# Patient Record
Sex: FEMALE | Race: WHITE | NOT HISPANIC OR LATINO | Employment: FULL TIME | URBAN - METROPOLITAN AREA
[De-identification: names, ages, dates, MRNs, and addresses within clinical notes are randomized per-mention and may not be internally consistent; named-entity substitution may affect disease eponyms.]

---

## 2022-01-17 ENCOUNTER — OFFICE VISIT (OUTPATIENT)
Dept: NEUROLOGY | Facility: CLINIC | Age: 42
End: 2022-01-17

## 2022-01-17 VITALS
HEART RATE: 87 BPM | BODY MASS INDEX: 23.92 KG/M2 | SYSTOLIC BLOOD PRESSURE: 116 MMHG | OXYGEN SATURATION: 98 % | WEIGHT: 130 LBS | TEMPERATURE: 97.8 F | HEIGHT: 62 IN | DIASTOLIC BLOOD PRESSURE: 68 MMHG

## 2022-01-17 DIAGNOSIS — G43.009 MIGRAINE WITHOUT AURA AND WITHOUT STATUS MIGRAINOSUS, NOT INTRACTABLE: Primary | ICD-10-CM

## 2022-01-17 PROBLEM — G47.63 SLEEP-RELATED BRUXISM: Status: ACTIVE | Noted: 2022-01-17

## 2022-01-17 PROCEDURE — 99204 OFFICE O/P NEW MOD 45 MIN: CPT | Performed by: NURSE PRACTITIONER

## 2022-01-17 RX ORDER — SERTRALINE HYDROCHLORIDE 100 MG/1
TABLET, FILM COATED ORAL
COMMUNITY
Start: 2022-01-05 | End: 2022-09-28 | Stop reason: SDUPTHER

## 2022-01-17 RX ORDER — IBUPROFEN 800 MG/1
1 TABLET ORAL EVERY 8 HOURS SCHEDULED
COMMUNITY
Start: 2021-11-09 | End: 2023-03-02 | Stop reason: SDUPTHER

## 2022-01-17 RX ORDER — DICYCLOMINE HCL 20 MG
1 TABLET ORAL 4 TIMES DAILY PRN
COMMUNITY
Start: 2021-09-02 | End: 2022-06-28

## 2022-01-17 RX ORDER — TRAZODONE HYDROCHLORIDE 100 MG/1
TABLET ORAL
COMMUNITY
Start: 2022-01-05 | End: 2022-07-06

## 2022-01-17 RX ORDER — SUMATRIPTAN 50 MG/1
50 TABLET, FILM COATED ORAL
COMMUNITY
End: 2022-06-28

## 2022-01-17 RX ORDER — FLUTICASONE PROPIONATE 50 MCG
1-2 SPRAY, SUSPENSION (ML) NASAL
COMMUNITY
Start: 2021-09-20 | End: 2022-09-28 | Stop reason: SDUPTHER

## 2022-01-17 RX ORDER — PROPRANOLOL HYDROCHLORIDE 20 MG/1
20 TABLET ORAL 2 TIMES DAILY
Qty: 60 TABLET | Refills: 3 | Status: SHIPPED | OUTPATIENT
Start: 2022-01-17 | End: 2022-05-26

## 2022-01-17 NOTE — PROGRESS NOTES
Headache New Office Visit      Encounter Date: 2022   Patient Name: Ivette Hughes  : 1980   MRN: [id number]   PCP: Dr Liu  Chief Complaint:    Chief Complaint   Patient presents with   • Migraine       History of Present Illness: Ivette Hughes is a 41 y.o. female who is here today with her mother for evaluation of headaches.     Headache Symptoms:   Onset . Worse since covid in Aug 2021  Prior to covid 8-12 migraines/ month. Since covid it has been daily. Has only had 5 headache free days since covid. Severity and frequency and sx of nausea increased since covid.    Days per month: daily  Location: starts behind left eye and radiates to left temple and forehead, then band distribution      Quality: Throbbing and sqeezing  Duration: 24 hours. Goes to sleep and wakes up with a headache.   Severity: 7-8/10  Triggers: hormones, odor, stress  Associated Symptoms: Nausea, Vomiting, Photophobia, Phonophobia, Scent sensitivity , Dizziness and  Vision changes black spots that move  Aura: scotoma  Visual Changes: black spots    Abortives: Imitrex, Maxalt, Nurtec-ineffective, fioricet,  lortabs, toradol, Aleve, IBU, excedrin, Tylenol  Preventives:Emgality x2 years, stopped due ineffective Elavil-ineffective, Topamax-brain fog                                                                           Currently taking  TID since November.   Does not drink enough water during the day.  Sleeping 6-8 hours a night  Quit seeing a counselor. Stress can be extreme.   Has good support from Mom.    Has not had covid vaccine and does not plan on being vaccinated.    Has used Botox for TMJ with success    Greater than 15 HA a month, lasting over 4 - 6 hours, moderate to severe intensity. Lasting over 6 months.    Subjective      Past Medical History:   Past Medical History:   Diagnosis Date   • Anxiety    • Cluster headache    • Gall stones    • Kidney stones    • Migraine headache    • Panic attack     • Tension headache        Past Surgical History:   Past Surgical History:   Procedure Laterality Date   • APPENDECTOMY     •  SECTION     • CHOLECYSTECTOMY         Family History:   Family History   Problem Relation Age of Onset   • Hypertension Mother    • Migraines Mother    • Leukemia Father    • Hypertension Father    • Migraine headaches Son        Social History:   Social History     Socioeconomic History   • Marital status:    Tobacco Use   • Smoking status: Never Smoker   • Smokeless tobacco: Never Used   Vaping Use   • Vaping Use: Never used   Substance and Sexual Activity   • Alcohol use: Never   • Drug use: Never   • Sexual activity: Defer       Medications:     Current Outpatient Medications:   •  dicyclomine (BENTYL) 20 MG tablet, Take 1 tablet by mouth 4 (Four) Times a Day As Needed., Disp: , Rfl:   •  fluticasone (FLONASE) 50 MCG/ACT nasal spray, 1-2 sprays into the nostril(s) as directed by provider., Disp: , Rfl:   •  Galcanezumab-gnlm (EMGALITY SC), Inject  under the skin into the appropriate area as directed., Disp: , Rfl:   •  ibuprofen (ADVIL,MOTRIN) 800 MG tablet, Take 1 tablet by mouth Every 8 (Eight) Hours., Disp: , Rfl:   •  sertraline (Zoloft) 100 MG tablet, TAKE 1.5 TABLET BY MOUTH DAILY, Disp: , Rfl:   •  SUMAtriptan (IMITREX) 50 MG tablet, Take 50 mg by mouth Every 2 (Two) Hours As Needed for Migraine. Take one tablet at onset of headache. May repeat dose one time in 2 hours if headache not relieved., Disp: , Rfl:   •  traZODone (DESYREL) 100 MG tablet, , Disp: , Rfl:   •  propranolol (INDERAL) 20 MG tablet, Take 1 tablet by mouth 2 (Two) Times a Day., Disp: 60 tablet, Rfl: 3    Allergies:   No Known Allergies    PHQ-9 Total Score:     STEADI Fall Risk Assessment has not been completed.    Objective     Physical Exam:   Physical Exam  Eyes:      Pupils: Pupils are equal, round, and reactive to light.   Neurological:      Mental Status: She is oriented to person,  place, and time.      Coordination: Finger-Nose-Finger Test, Heel to Shin Test and Romberg Test normal.      Gait: Gait is intact.      Deep Tendon Reflexes:      Reflex Scores:       Tricep reflexes are 2+ on the right side and 2+ on the left side.       Bicep reflexes are 2+ on the right side and 2+ on the left side.       Brachioradialis reflexes are 2+ on the right side and 2+ on the left side.       Patellar reflexes are 2+ on the right side and 2+ on the left side.       Achilles reflexes are 2+ on the right side and 2+ on the left side.  Psychiatric:         Speech: Speech normal.         Neurologic Exam     Mental Status   Oriented to person, place, and time.   Follows 3 step commands.   Attention: normal. Concentration: normal.   Speech: speech is normal   Level of consciousness: alert  Knowledge: consistent with education.   Normal comprehension.     Cranial Nerves     CN III, IV, VI   Pupils are equal, round, and reactive to light.  Right pupil: Accommodation: intact.   Left pupil: Accommodation: intact.   CN III: no CN III palsy  CN VI: no CN VI palsy  Nystagmus: none   Diplopia: none  Upgaze: normal  Downgaze: normal  Conjugate gaze: present    CN VII   Facial expression full, symmetric.     CN VIII   Hearing: intact    CN XII   CN XII normal.     Motor Exam   Muscle bulk: normal  Overall muscle tone: normal    Strength   Right biceps: 5/5  Left biceps: 5/5  Right triceps: 5/5  Left triceps: 5/5  Right interossei: 5/5  Left interossei: 5/5  Right quadriceps: 5/5  Left quadriceps: 5/5  Right anterior tibial: 5/5  Left anterior tibial: 5/5  Right posterior tibial: 5/5  Left posterior tibial: 5/5    Sensory Exam   Light touch normal.     Gait, Coordination, and Reflexes     Gait  Gait: normal    Coordination   Romberg: negative  Finger to nose coordination: normal  Heel to shin coordination: normal    Tremor   Resting tremor: absent  Action tremor: absent    Reflexes   Right brachioradialis: 2+  Left  "brachioradialis: 2+  Right biceps: 2+  Left biceps: 2+  Right triceps: 2+  Left triceps: 2+  Right patellar: 2+  Left patellar: 2+  Right achilles: 2+  Left achilles: 2+  Right : 2+  Left : 2+       Vital Signs:   Vitals:    01/17/22 0903   BP: 116/68   Pulse: 87   Temp: 97.8 °F (36.6 °C)   SpO2: 98%   Weight: 59 kg (130 lb)   Height: 157.5 cm (62\")     Body mass index is 23.78 kg/m².     Results:   Imaging:   No Images in the past 120 days found..       Assessment / Plan      Assessment/Plan:   Diagnoses and all orders for this visit:    1. Migraine without aura and without status migrainosus, not intractable (Primary)  Comments:  Add propranolol and Botox  Orders:  -     MRI Brain With & Without Contrast; Future  -     propranolol (INDERAL) 20 MG tablet; Take 1 tablet by mouth 2 (Two) Times a Day.  Dispense: 60 tablet; Refill: 3           Patient Education:   I have discussed with the patient today the causes and overview of headaches. We discussed the different types of headaches to include tension-type headaches, Migraine headaches and Cluster headaches. We also discussed when headaches could or would be of a more serious condition such as brain infection, inflammation or bleeding within or around the brain. When to seek immediate medical attention or call 911.     Reviewed medications, potential side effects and signs and symptoms to report. Discussed risk versus benefits of treatment plan with patient and/or family-including medications, labs and radiology that may be ordered. Addressed questions and concerns during visit. Patient and/or family verbalized understanding and agree with plan. Instructed to call the office with any questions and report to ER with any life-threatening symptoms.     Follow Up:   Return if symptoms worsen or fail to improve.    During this visit the following were done:  Labs Reviewed []    Labs Ordered []    Radiology Reports Reviewed []    Radiology Ordered [x]    PCP " Records Reviewed [x]    Referring Provider Records Reviewed []    ER Records Reviewed []    Hospital Records Reviewed []    History Obtained From Family [x]    Radiology Images Reviewed []    Other Reviewed [x]    Records Requested []      Kyra Vázquez, DNP, APRN

## 2022-01-19 ENCOUNTER — TELEPHONE (OUTPATIENT)
Dept: NEUROLOGY | Facility: CLINIC | Age: 42
End: 2022-01-19

## 2022-01-19 ENCOUNTER — SPECIALTY PHARMACY (OUTPATIENT)
Dept: ONCOLOGY | Facility: HOSPITAL | Age: 42
End: 2022-01-19

## 2022-01-19 NOTE — TELEPHONE ENCOUNTER
DILLON from [facility]----- Message from Chante Shukla, Pharmacy Technician sent at 1/19/2022  7:51 AM EST -----  Regarding: Botox  Botox has been approved through the pharmacy side. [facility] will fill the Botox and ship to [facility]. Once I have set up shipping and discussed copay program/cost with patient, we will schedule delivery. Patient can then be scheduled for Botox. Thanks.

## 2022-01-20 ENCOUNTER — TELEPHONE (OUTPATIENT)
Dept: NEUROLOGY | Facility: CLINIC | Age: 42
End: 2022-01-20

## 2022-01-20 NOTE — PROGRESS NOTES
Specialty Pharmacy Refill Coordination Note     Ivette is a 41 y.o. female contacted today regarding her Botox 200 units specialty medication(s). Patient will receive treatment at the provider's office.    Reviewed and verified with patient: Yes  Specialty medication(s) and dose(s) confirmed: yes        Delivery Questions      Most Recent Value   Delivery method FedEx  [Ship Botox to Dr. Nuñez's Office: [address]   Delivery address correct? Yes   Preferred delivery time? Anytime   Number of medications in delivery 1   Medication being filled and delivered Botox 200 units   Doses left of specialty medications 0   Is there any medication that is due not being filled? No   Supplies needed? No supplies needed   Cooler needed? Yes   Do any medications need mixed or dated? No   Copay form of payment Credit card on file   Questions or concerns for the pharmacist? No   Are any medications first time fills? No                 Follow-up: 12 weeks     Catalina Shukla, Pharmacy Technician  Specialty Pharmacy Technician

## 2022-01-20 NOTE — TELEPHONE ENCOUNTER
----- Message from Catalina Shukla, Pharmacy Technician sent at 1/20/2022  9:35 AM EST -----  Regarding: Botox  Just spoke with the patient and she is good to go. [facility] will ship the script to the office on Monday 1/24/2022, and it should arrive Tuesday 1/25/2022. Please schedule the patient for anytime after 1/25. Nova will help educate. Thanks.

## 2022-01-26 ENCOUNTER — PROCEDURE VISIT (OUTPATIENT)
Dept: NEUROLOGY | Facility: CLINIC | Age: 42
End: 2022-01-26

## 2022-01-26 DIAGNOSIS — G43.009 MIGRAINE WITHOUT AURA AND WITHOUT STATUS MIGRAINOSUS, NOT INTRACTABLE: Primary | ICD-10-CM

## 2022-01-26 PROCEDURE — 64615 CHEMODENERV MUSC MIGRAINE: CPT | Performed by: NURSE PRACTITIONER

## 2022-01-26 NOTE — PROGRESS NOTES
Botox Procedure Note       Patient Name: Ivette Hughes  : 1980   MRN: [id number]     Chief Complaint:    Chief Complaint   Patient presents with   • Botulinum Toxin Injection     Patient supplied, 200 units       History of Present Illness: Ivette Hughes is a 41 y.o. female who is here today for Botox injections for migraines.    This is her first Botox injection for migraines.    Abortives: Imitrex, Maxalt, Nurtec-ineffective, fioricet,  lortabs, toradol, Aleve, IBU, excedrin, Tylenol  Preventives:Emgality x2 years, stopped due ineffective Elavil-ineffective, Topamax-brain fog, propranolol                                                                               We have discussed risk and benefits of this Botox procedure and common side effects including headache, bleeding, infection, worsening of pain, neck pain, neck stiffness or weakness, damage to the areas being injected, weakness of muscles, loss of muscle control, ptosis, flu-like symptoms as well as more serious possible adverse effects including possible dysphagia, facial droop if injecting the facial area, painful injection, respiratory distress or even death (death has only been reported once with adults for Botox for migraines in another state when mixed with lidocaine solution which we do not use lidocaine solution in our practice for mixing Botox). The patient verbally understands and accepts risks and agrees with moving forward with Botox injections for chronic migraine prevention.    Verbal and written consent has been obtained from the patient prior to beginning treatment injections.          Subjective     The following portions of the patient's history were reviewed an updated as appropriate: past family history, past medical history, past social history, past surgical history.     Medications:     Current Outpatient Medications:   •  dicyclomine (BENTYL) 20 MG tablet, Take 1 tablet by mouth 4 (Four) Times a Day As Needed., Disp: ,  Rfl:   •  fluticasone (FLONASE) 50 MCG/ACT nasal spray, 1-2 sprays into the nostril(s) as directed by provider., Disp: , Rfl:   •  Galcanezumab-gnlm (EMGALITY SC), Inject  under the skin into the appropriate area as directed., Disp: , Rfl:   •  ibuprofen (ADVIL,MOTRIN) 800 MG tablet, Take 1 tablet by mouth Every 8 (Eight) Hours., Disp: , Rfl:   •  OnabotulinumtoxinA 200 units reconstituted solution, FOR . PHYSICIAN TO INJECT 155 UNITS INTRAMUSCULARLY INTO HEAD, NECK AND SHOULDERS EVERY 12 WEEKS Per FDA PROTOCOL, Disp: 1 each, Rfl: 3  •  propranolol (INDERAL) 20 MG tablet, Take 1 tablet by mouth 2 (Two) Times a Day., Disp: 60 tablet, Rfl: 3  •  sertraline (Zoloft) 100 MG tablet, TAKE 1.5 TABLET BY MOUTH DAILY, Disp: , Rfl:   •  SUMAtriptan (IMITREX) 50 MG tablet, Take 50 mg by mouth Every 2 (Two) Hours As Needed for Migraine. Take one tablet at onset of headache. May repeat dose one time in 2 hours if headache not relieved., Disp: , Rfl:   •  traZODone (DESYREL) 100 MG tablet, , Disp: , Rfl:     Allergies:   No Known Allergies    Objective     Physical Exam:  Vital Signs: There were no vitals filed for this visit.  There is no height or weight on file to calculate BMI.     Physical Exam  Constitutional:       General: She is not in acute distress.     Appearance: She is well-developed.   HENT:      Head: Normocephalic and atraumatic.      Right Ear: External ear normal.      Left Ear: External ear normal.      Nose: Nose normal.   Eyes:      General: No scleral icterus.        Right eye: No discharge.         Left eye: No discharge.      Conjunctiva/sclera: Conjunctivae normal.   Pulmonary:      Effort: Pulmonary effort is normal.   Musculoskeletal:         General: No deformity.      Cervical back: Neck supple.   Skin:     General: Skin is warm and dry.      Findings: No rash.   Neurological:      General: No focal deficit present.      Mental Status: She is alert and oriented to person, place,  and time. Mental status is at baseline.      Motor: No abnormal muscle tone.   Psychiatric:         Mood and Affect: Mood normal.         Behavior: Behavior normal.         Thought Content: Thought content normal.         Judgment: Judgment normal.         Neurologic Exam     Mental Status   Oriented to person, place, and time.       BOTOX PROCEDURE:     The patient was placed in a comfortable area and the sites to be treated were identified. A time out was called and performed. The area to be treated was prepped three times with alcohol and the alcohol allowed to dry. Next, a 30 gauge needle was used to inject the medication in the area to be treated.     Date of Last Injection: first  Response: NA  Onset of response: NA   Wearing off: NA  Side Effects: NA  : Allergan  Lot #: [id number]  Expiration Date: 09/2024  NDC: [id number]     Botox was injected using FDA approved protocol for chronic migraine prevention.   200 unit vial Botox was re-constituted with 4 mL 0.9 NS to 5 unit/0.1 mL using standard techniques.  10 units were given at the  muscle in 2 divided sites  5 units were given at the Procerus muscle  20 units were given at the Frontalis muscle in 4 divided sites  40 units were given at the Temporalis muscle in 8 divided sites  30 units were given at the Occipitalis muscle in 6 divided sites  20 units were given at the Cervical paraspinal muscle in 4 divided sites  30 units were given at the Trapezius muscle in 6 divided sites    The total amount injected in units is 155  The total amount wasted in units is 45  The total amount submitted in units is 200.   Botox was administered by Nurse practitioner.     Patient tolerated procedure well with no immediate complications.     Assessment / Plan      Assessment/Plan:   Diagnoses and all orders for this visit:    1. Migraine without aura and without status migrainosus, not intractable (Primary)         Patient Education:     Reviewed  medications, potential side effects and signs and symptoms to report. Discussed risk versus benefits of treatment plan with patient and/or family-including medications, labs and radiology that may be ordered. Addressed questions and concerns during visit. Patient and/or family verbalized understanding and agree with plan. Instructed to call the office with any questions and report to ER with any life-threatening symptoms.     Follow Up:   It has been determined that [unfilled] has chronic migraine headaches. We will proceed with a 12 week regimen of 200 units of Botox injections, to treat the patient's chronic migraines.  Return in about 3 months (around 4/26/2022).    During this visit the following were done:  Labs Reviewed []    Labs Ordered []    Radiology Reports Reviewed []    Radiology Ordered []    PCP Records Reviewed []    Referring Provider Records Reviewed []    ER Records Reviewed []    Hospital Records Reviewed []    History Obtained From Family []    Radiology Images Reviewed []    Other Reviewed [x]    Records Requested []        Kyra Vázquez, DNP, APRN

## 2022-01-27 ENCOUNTER — PATIENT ROUNDING (BHMG ONLY) (OUTPATIENT)
Dept: NEUROLOGY | Facility: CLINIC | Age: 42
End: 2022-01-27

## 2022-01-27 NOTE — PROGRESS NOTES
January 27, 2022    Hello, may I speak with Ivette Hughes?    My name is TERRANCE     I am  with [facility]  [address]  [place] [facility] [address] [phone].    Before we get started may I verify your date of birth? 1980    I am calling to officially welcome you to our practice and ask about your recent visit. Is this a good time to talk?   YES    Tell me about your visit with us. What things went well?    THE STAFF WAS FRIENDLY. SHE CAME UP WITH A TREATMENT PLAN FOR ME. IT WAS A GREAT FIRST VISIT.       We're always looking for ways to make our patients' experiences even better. Do you have recommendations on ways we may improve?    NO    Overall were you satisfied with your first visit to our practice?   YES     I appreciate you taking the time to speak with me today. Is there anything else I can do for you?  NO      Thank you, and have a great day.

## 2022-02-21 ENCOUNTER — APPOINTMENT (OUTPATIENT)
Dept: MRI IMAGING | Facility: HOSPITAL | Age: 42
End: 2022-02-21

## 2022-03-15 ENCOUNTER — HOSPITAL ENCOUNTER (OUTPATIENT)
Dept: MRI IMAGING | Facility: HOSPITAL | Age: 42
Discharge: HOME | End: 2022-03-15
Admitting: NURSE PRACTITIONER

## 2022-03-15 DIAGNOSIS — G43.009 MIGRAINE WITHOUT AURA AND WITHOUT STATUS MIGRAINOSUS, NOT INTRACTABLE: ICD-10-CM

## 2022-03-15 PROCEDURE — 70553 MRI BRAIN STEM W/O & W/DYE: CPT

## 2022-03-15 PROCEDURE — A9577 INJ MULTIHANCE: HCPCS | Performed by: NURSE PRACTITIONER

## 2022-03-15 PROCEDURE — 0 GADOBENATE DIMEGLUMINE 529 MG/ML SOLUTION: Performed by: NURSE PRACTITIONER

## 2022-03-15 RX ADMIN — GADOBENATE DIMEGLUMINE 11 ML: 529 INJECTION, SOLUTION INTRAVENOUS at 13:26

## 2022-03-17 ENCOUNTER — TELEPHONE (OUTPATIENT)
Dept: NEUROLOGY | Facility: CLINIC | Age: 42
End: 2022-03-17

## 2022-03-17 NOTE — TELEPHONE ENCOUNTER
----- Message from Kyra Vázquez, JOHANNA, APRN sent at 3/16/2022 10:10 AM EDT -----  Please let her know MRI of brain is normal.

## 2022-03-24 RX ORDER — AZITHROMYCIN 250 MG/1
TABLET, FILM COATED ORAL
Qty: 6 TABLET | Refills: 0 | Status: SHIPPED | OUTPATIENT
Start: 2022-03-24 | End: 2022-06-28

## 2022-03-24 RX ORDER — METHYLPREDNISOLONE 4 MG/1
TABLET ORAL
Qty: 21 TABLET | Refills: 0 | Status: SHIPPED | OUTPATIENT
Start: 2022-03-24 | End: 2022-06-28

## 2022-04-14 ENCOUNTER — SPECIALTY PHARMACY (OUTPATIENT)
Dept: ONCOLOGY | Facility: HOSPITAL | Age: 42
End: 2022-04-14

## 2022-04-15 ENCOUNTER — SPECIALTY PHARMACY (OUTPATIENT)
Dept: ONCOLOGY | Facility: HOSPITAL | Age: 42
End: 2022-04-15

## 2022-04-15 NOTE — PROGRESS NOTES
Specialty Pharmacy Refill Coordination Note     Ivette is a 41 y.o. female contacted today regarding refills of  Botox specialty medication(s). Patient appointment is scheduled for 4/21.    Reviewed and verified with patient:         Specialty medication(s) and dose(s) confirmed: yes    Refill Questions    Flowsheet Row Most Recent Value   Changes to allergies? No   Changes to medications? No   New conditions since last clinic visit No   Unplanned office visit, urgent care, ED, or hospital admission in the last 4 weeks  No   How does patient/caregiver feel medication is working? Excellent   Financial problems or insurance changes  No   If yes, describe changes in insurance or financial issues. N/A   How many doses of your specialty medications were missed in the last 4 weeks? 0   Why were doses missed? N/A   Does this patient require a clinical escalation to a pharmacist? No          Delivery Questions    Flowsheet Row Most Recent Value   Delivery method FedEx   Delivery address correct? Yes   Preferred delivery time? Anytime   Number of medications in delivery 1   Medication being filled and delivered Botox-shipped to [facility] left of specialty medications 0   Is there any medication that is due not being filled? No   Supplies needed? No supplies needed   Cooler needed? Yes   Do any medications need mixed or dated? No   Copay form of payment Payment plan already set up   Questions or concerns for the pharmacist? No   Are any medications first time fills? No                 Follow-up: 84 day(s)     Ne Caraballo, Pharmacy Technician  Specialty Pharmacy Technician

## 2022-04-19 DIAGNOSIS — Z87.442 HISTORY OF NEPHROLITHIASIS: ICD-10-CM

## 2022-04-19 DIAGNOSIS — R10.9 FLANK PAIN: Primary | ICD-10-CM

## 2022-04-21 ENCOUNTER — PROCEDURE VISIT (OUTPATIENT)
Dept: NEUROLOGY | Facility: CLINIC | Age: 42
End: 2022-04-21

## 2022-04-21 DIAGNOSIS — G43.009 MIGRAINE WITHOUT AURA AND WITHOUT STATUS MIGRAINOSUS, NOT INTRACTABLE: Primary | ICD-10-CM

## 2022-04-21 PROCEDURE — 64615 CHEMODENERV MUSC MIGRAINE: CPT | Performed by: NURSE PRACTITIONER

## 2022-04-21 NOTE — PROGRESS NOTES
Botox Procedure Note       Patient Name: Ivette Hughes  : 1980   MRN: [id number]   PCP: Dr Macias  Chief Complaint:    Chief Complaint   Patient presents with   • Botulinum Toxin Injection       History of Present Illness: Ivette Hughes is a 41 y.o. female who is here today for Botox injections for migraines.     Had a breakthrough HA requiring Toradol.    Abortives: Imitrex, Maxalt, Nurtec-ineffective, fioricet,  lortabs, toradol, Aleve, IBU, excedrin, Tylenol  Preventives:Emgality x2 years, stopped due ineffective Elavil-ineffective, Topamax-brain fog, propranolol           We have discussed risk and benefits of this Botox procedure and common side effects including headache, bleeding, infection, worsening of pain, neck pain, neck stiffness or weakness, damage to the areas being injected, weakness of muscles, loss of muscle control, ptosis, flu-like symptoms as well as more serious possible adverse effects including possible dysphagia, facial droop if injecting the facial area, painful injection, respiratory distress or even death (death has only been reported once with adults for Botox for migraines in another state when mixed with lidocaine solution which we do not use lidocaine solution in our practice for mixing Botox). The patient verbally understands and accepts risks and agrees with moving forward with Botox injections for chronic migraine prevention.    Verbal and written consent has been obtained from the patient prior to beginning treatment injections.     Pertinent Medical History:  Response to treatment has reduced headaches at least 7 fewer days a month and / or 100 hours fewer each month.       Subjective        The following portions of the patient's history were reviewed an updated as appropriate: past family history, past medical history, past social history, past surgical history.     Medications:     Current Outpatient Medications:   •  azithromycin (Zithromax Z-Eber) 250 MG  tablet, 500mg PO x 1 on day 1, then 250mg PO q24h x 4 days, Disp: 6 tablet, Rfl: 0  •  dicyclomine (BENTYL) 20 MG tablet, Take 1 tablet by mouth 4 (Four) Times a Day As Needed., Disp: , Rfl:   •  fluticasone (FLONASE) 50 MCG/ACT nasal spray, 1-2 sprays into the nostril(s) as directed by provider., Disp: , Rfl:   •  Galcanezumab-gnlm (EMGALITY SC), Inject  under the skin into the appropriate area as directed., Disp: , Rfl:   •  ibuprofen (ADVIL,MOTRIN) 800 MG tablet, Take 1 tablet by mouth Every 8 (Eight) Hours., Disp: , Rfl:   •  methylPREDNISolone (MEDROL) 4 MG dose pack, Take as directed on package instructions., Disp: 21 tablet, Rfl: 0  •  OnabotulinumtoxinA 200 units reconstituted solution, FOR . PHYSICIAN TO INJECT 155 UNITS INTRAMUSCULARLY INTO HEAD, NECK AND SHOULDERS EVERY 12 WEEKS Per FDA PROTOCOL, Disp: 1 each, Rfl: 3  •  propranolol (INDERAL) 20 MG tablet, Take 1 tablet by mouth 2 (Two) Times a Day., Disp: 60 tablet, Rfl: 3  •  sertraline (Zoloft) 100 MG tablet, TAKE 1.5 TABLET BY MOUTH DAILY, Disp: , Rfl:   •  SUMAtriptan (IMITREX) 50 MG tablet, Take 50 mg by mouth Every 2 (Two) Hours As Needed for Migraine. Take one tablet at onset of headache. May repeat dose one time in 2 hours if headache not relieved., Disp: , Rfl:   •  traZODone (DESYREL) 100 MG tablet, , Disp: , Rfl:     Current Facility-Administered Medications:   •  OnabotulinumtoxinA 200 Units, 200 Units, Intramuscular, Q3 Months, Kyra Vázquez, DNP, APRN, 200 Units at 01/26/22 1401    Allergies:   No Known Allergies    Objective     Physical Exam:  Vital Signs: There were no vitals filed for this visit.  There is no height or weight on file to calculate BMI.     Physical Exam    Neurologic Exam    BOTOX PROCEDURE:     The patient was placed in a comfortable area and the sites to be treated were identified. A time out was called and performed. The area to be treated was prepped three times with alcohol and the  alcohol allowed to dry. Next, a 30 gauge needle was used to inject the medication in the area to be treated.     Date of Last Injection: 1/26/2022  Response: good  Onset of response: 1 week   Wearing off: no  Side Effects: no  :  [employer]  Lot #: [id number]  Expiration Date: 12/2024  NDC: 42807dg27    Botox is patient supplied.     Botox was injected using FDA approved protocol for chronic migraine prevention.   200 unit vial Botox was re-constituted with 4 mL 0.9 NS to 5 unit/0.1 mL using standard techniques.  10 units were given at the  muscle in 2 divided sites  5 units were given at the Procerus muscle  20 units were given at the Frontalis muscle in 4 divided sites  40 units were given at the Temporalis muscle in 8 divided sites  30 units were given at the Occipitalis muscle in 6 divided sites  20 units were given at the Cervical paraspinal muscle in 4 divided sites  30 units were given at the Trapezius muscle in 6 divided sites    The total amount injected in units is 155  The total amount wasted in units is 45  The total amount submitted in units is 200.   Botox was administered by Nurse practitioner.     Patient tolerated procedure well with no immediate complications.     Assessment / Plan      Assessment/Plan:   Diagnoses and all orders for this visit:    1. Migraine without aura and without status migrainosus, not intractable (Primary)  Comments:  Cont Botox.     Pt supplied    Patient Education:       Reviewed medications, potential side effects and signs and symptoms to report. Discussed risk versus benefits of treatment plan with patient and/or family-including medications, labs and radiology that may be ordered. Addressed questions and concerns during visit. Patient and/or family verbalized understanding and agree with plan. Instructed to call the office with any questions and report to ER with any life-threatening symptoms.     Follow Up:   It has been determined that  [unfilled] has chronic migraine headaches. We will proceed with a 12 week regimen of 200 units of Botox injections, to treat the patient's chronic migraines.  No follow-ups on file.    During this visit the following were done:  Labs Reviewed []    Labs Ordered []    Radiology Reports Reviewed []    Radiology Ordered []    PCP Records Reviewed []    Referring Provider Records Reviewed []    ER Records Reviewed []    Hospital Records Reviewed []    History Obtained From Family []    Radiology Images Reviewed []    Other Reviewed [x]    Records Requested []        Kyra Vázquez, DNP, APRN

## 2022-05-26 DIAGNOSIS — G43.009 MIGRAINE WITHOUT AURA AND WITHOUT STATUS MIGRAINOSUS, NOT INTRACTABLE: ICD-10-CM

## 2022-05-26 RX ORDER — PROPRANOLOL HYDROCHLORIDE 20 MG/1
TABLET ORAL
Qty: 60 TABLET | Refills: 3 | Status: SHIPPED | OUTPATIENT
Start: 2022-05-26 | End: 2022-06-28

## 2022-05-26 NOTE — TELEPHONE ENCOUNTER
Rx Refill Note  Requested Prescriptions     Pending Prescriptions Disp Refills   • propranolol (INDERAL) 20 MG tablet [Pharmacy Med Name: PROPRANOLOL 20 MG TABLET] 60 tablet 3     Sig: TAKE ONE TABLET BY MOUTH TWICE A DAY      Last filled: 1/17/22 with 3 refills  Sent this in.   Last office visit with prescribing clinician: 1/17/2022      Next office visit with prescribing clinician: 7/15/2022     Falguni Mckinney MA  05/26/22, 10:04 EDT

## 2022-06-14 ENCOUNTER — DOCUMENTATION (OUTPATIENT)
Dept: FAMILY MEDICINE CLINIC | Facility: CLINIC | Age: 42
End: 2022-06-14

## 2022-06-14 RX ORDER — FLUCONAZOLE 150 MG/1
TABLET ORAL
Qty: 2 TABLET | Refills: 1 | Status: SHIPPED | OUTPATIENT
Start: 2022-06-14 | End: 2022-06-28

## 2022-06-28 ENCOUNTER — OFFICE VISIT (OUTPATIENT)
Dept: FAMILY MEDICINE CLINIC | Facility: CLINIC | Age: 42
End: 2022-06-28

## 2022-06-28 VITALS
OXYGEN SATURATION: 98 % | DIASTOLIC BLOOD PRESSURE: 68 MMHG | TEMPERATURE: 97.7 F | SYSTOLIC BLOOD PRESSURE: 110 MMHG | BODY MASS INDEX: 24.73 KG/M2 | WEIGHT: 134.4 LBS | HEART RATE: 97 BPM | HEIGHT: 62 IN

## 2022-06-28 DIAGNOSIS — G43.719 INTRACTABLE CHRONIC MIGRAINE WITHOUT AURA AND WITHOUT STATUS MIGRAINOSUS: Primary | ICD-10-CM

## 2022-06-28 DIAGNOSIS — N89.8 VAGINAL ODOR: ICD-10-CM

## 2022-06-28 PROCEDURE — 96372 THER/PROPH/DIAG INJ SC/IM: CPT | Performed by: FAMILY MEDICINE

## 2022-06-28 PROCEDURE — 99214 OFFICE O/P EST MOD 30 MIN: CPT | Performed by: FAMILY MEDICINE

## 2022-06-28 RX ORDER — METHYLPREDNISOLONE 4 MG/1
TABLET ORAL
Qty: 21 TABLET | Refills: 0 | Status: SHIPPED | OUTPATIENT
Start: 2022-06-28 | End: 2022-09-28

## 2022-06-28 RX ORDER — KETOROLAC TROMETHAMINE 30 MG/ML
60 INJECTION, SOLUTION INTRAMUSCULAR; INTRAVENOUS ONCE
Status: COMPLETED | OUTPATIENT
Start: 2022-06-28 | End: 2022-06-28

## 2022-06-28 RX ORDER — HYDROCODONE BITARTRATE AND ACETAMINOPHEN 5; 325 MG/1; MG/1
1 TABLET ORAL EVERY 4 HOURS PRN
Qty: 18 TABLET | Refills: 0 | Status: SHIPPED | OUTPATIENT
Start: 2022-06-28 | End: 2022-09-28

## 2022-06-28 RX ADMIN — KETOROLAC TROMETHAMINE 60 MG: 30 INJECTION, SOLUTION INTRAMUSCULAR; INTRAVENOUS at 16:05
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
## 2022-06-28 NOTE — PROGRESS NOTES
Date: 2022   Patient Name: Ivette Hughes  : 1980   MRN: [id number]     Chief Complaint:    Chief Complaint   Patient presents with   • Migraine       History of Present Illness: Ivette Hughes is a 41 y.o. female who is here today for Persistent migraine.  She reports intractable headache that feels like a band squeezing the top of her head and sometimes moves behind her left eye.  She has tried several different medications including Topamax, Imitrex, Emgality, amitriptyline, propranolol, Ubrelvy, and Botox injections without improvement in her headaches.  She has suffered from chronic migraines for years but it has worsened since having COVID.  She has had a normal MRI and seen neurology without any significant findings.  She reports that it is significantly impacting her daily life.  Her mother had similar symptoms which resolved after a total hysterectomy and the patient thinks that hers may be hormone related as well.    Patient also complains of vaginal odor however her sense of smell has been altered since having COVID last year.  She denies discharge or vaginal itching.  She denies known history of STI or contact with anyone with a known STI.           Review of Systems:   Review of Systems   Constitutional: Negative for chills, fatigue and fever.   Respiratory: Negative for cough and shortness of breath.    Cardiovascular: Negative for chest pain and palpitations.   Gastrointestinal: Negative for abdominal pain, constipation, diarrhea, nausea and vomiting.   Genitourinary: Negative for vaginal discharge.        Vaginal odor   Musculoskeletal: Negative for back pain and myalgias.   Neurological: Positive for headache. Negative for dizziness.   Psychiatric/Behavioral: Negative for depressed mood. The patient is not nervous/anxious.        Past Medical History:   Past Medical History:   Diagnosis Date   • Anxiety    • Cluster headache    • Gall stones    • Insomnia    • Kidney stones    •  "Migraine headache    • Panic attack    • Tension headache        Past Surgical History:   Past Surgical History:   Procedure Laterality Date   • APPENDECTOMY     •  SECTION     • CHOLECYSTECTOMY         Family History:   Family History   Problem Relation Age of Onset   • Hypertension Mother    • Migraines Mother    • Leukemia Father    • Hypertension Father    • Migraine headaches Son        Social History:   Social History     Socioeconomic History   • Marital status:    Tobacco Use   • Smoking status: Never Smoker   • Smokeless tobacco: Never Used   Vaping Use   • Vaping Use: Never used   Substance and Sexual Activity   • Alcohol use: Never   • Drug use: Never   • Sexual activity: Defer       Medications:     Current Outpatient Medications:   •  fluticasone (FLONASE) 50 MCG/ACT nasal spray, 1-2 sprays into the nostril(s) as directed by provider., Disp: , Rfl:   •  ibuprofen (ADVIL,MOTRIN) 800 MG tablet, Take 1 tablet by mouth Every 8 (Eight) Hours., Disp: , Rfl:   •  methylPREDNISolone (MEDROL) 4 MG dose pack, Take as directed on package instructions., Disp: 21 tablet, Rfl: 0  •  sertraline (ZOLOFT) 100 MG tablet, TAKE 1.5 TABLET BY MOUTH DAILY, Disp: , Rfl:   •  traZODone (DESYREL) 100 MG tablet, , Disp: , Rfl:   •  HYDROcodone-acetaminophen (NORCO) 5-325 MG per tablet, Take 1 tablet by mouth Every 4 (Four) Hours As Needed for Moderate Pain ., Disp: 18 tablet, Rfl: 0    Current Facility-Administered Medications:   •  OnabotulinumtoxinA 200 Units, 200 Units, Intramuscular, Q3 Months, Kyra Vázquez, DNP, APRN, 200 Units at 22 1700    Allergies:   No Known Allergies      Physical Exam:  Vital Signs:   Vitals:    22 1519   BP: 110/68   BP Location: Left arm   Patient Position: Sitting   Cuff Size: Adult   Pulse: 97   Temp: 97.7 °F (36.5 °C)   TempSrc: Temporal   SpO2: 98%   Weight: 61 kg (134 lb 6.4 oz)   Height: 157.5 cm (62\")     Body mass index is 24.58 kg/m². "     Physical Exam  Vitals and nursing note reviewed.   Constitutional:       Appearance: Normal appearance.   HENT:      Head: Normocephalic.   Pulmonary:      Effort: Pulmonary effort is normal.   Neurological:      Mental Status: She is alert and oriented to person, place, and time.   Psychiatric:         Mood and Affect: Mood normal.         Behavior: Behavior normal.           Assessment/Plan:   Diagnoses and all orders for this visit:    1. Intractable chronic migraine without aura and without status migrainosus (Primary)  Assessment & Plan:  Headaches are worsening.  Rx Medrol pack and Toradol in office.  She was given a short-term supply of hydrocodone and that this is not a long-term treatment for her migraine.  She was also given samples of Nurtec to start as prophylactic treatment        Orders:  -     methylPREDNISolone (MEDROL) 4 MG dose pack; Take as directed on package instructions.  Dispense: 21 tablet; Refill: 0  -     ketorolac (TORADOL) injection 60 mg  -     HYDROcodone-acetaminophen (NORCO) 5-325 MG per tablet; Take 1 tablet by mouth Every 4 (Four) Hours As Needed for Moderate Pain .  Dispense: 18 tablet; Refill: 0    2. Vaginal odor  Assessment & Plan:  Aptima swab performed    Orders:  -     NuSwab VG+ - Swab, Vagina; Future  -     NuSwab VG+ - Swab, Vagina         Follow Up:   Return in about 1 month (around 7/28/2022) for Med Recheck.    Emi Macias DO  [facility]

## 2022-06-29 LAB
A VAGINAE DNA VAG QL NAA+PROBE: ABNORMAL SCORE
BVAB2 DNA VAG QL NAA+PROBE: ABNORMAL SCORE
C ALBICANS DNA VAG QL NAA+PROBE: NEGATIVE
C GLABRATA DNA VAG QL NAA+PROBE: NEGATIVE
C TRACH DNA VAG QL NAA+PROBE: NEGATIVE
MEGA1 DNA VAG QL NAA+PROBE: ABNORMAL SCORE
N GONORRHOEA DNA VAG QL NAA+PROBE: NEGATIVE
T VAGINALIS DNA VAG QL NAA+PROBE: NEGATIVE

## 2022-06-30 RX ORDER — METRONIDAZOLE 500 MG/1
500 TABLET ORAL 2 TIMES DAILY
Qty: 14 TABLET | Refills: 0 | Status: SHIPPED | OUTPATIENT
Start: 2022-06-30 | End: 2022-09-28

## 2022-07-06 RX ORDER — TRAZODONE HYDROCHLORIDE 100 MG/1
TABLET ORAL
Qty: 180 TABLET | Refills: 1 | Status: SHIPPED | OUTPATIENT
Start: 2022-07-06 | End: 2022-09-28 | Stop reason: SDUPTHER

## 2022-07-08 ENCOUNTER — SPECIALTY PHARMACY (OUTPATIENT)
Dept: ONCOLOGY | Facility: HOSPITAL | Age: 42
End: 2022-07-08

## 2022-07-08 NOTE — PROGRESS NOTES
Specialty Pharmacy Refill Coordination Note     Ivette is a 41 y.o. female contacted today regarding refills of Botox specialty medication(s). Patient due Botox in provider's office on 7/15/2022. Shipped from [facility] to provider's office.    Reviewed and verified with patient: Yes  Specialty medication(s) and dose(s) confirmed: yes    Refill Questions    Flowsheet Row Most Recent Value   Changes to allergies? No   Changes to medications? No   New conditions since last clinic visit No   Unplanned office visit, urgent care, ED, or hospital admission in the last 4 weeks  Yes  [6/28 visit to PCP. Migraines still acting up. Escalated to pharmacist. Patient coming in on 7/15 for Botox.]   How does patient/caregiver feel medication is working? Fair   Financial problems or insurance changes  No   If yes, describe changes in insurance or financial issues. N/A   Since the previous refill, were any specialty medication doses or scheduled injections missed or delayed?  No   If yes, please provide the amount N/A   Why were doses missed? N/A   Does this patient require a clinical escalation to a pharmacist? Yes  [6/28 visit to PCP. Migraines still acting up. Escalated to pharmacist. Patient coming in on 7/15 for Botox.]          Delivery Questions    Flowsheet Row Most Recent Value   Delivery method FedEx  [Ship Botox to Dr. Nuñez's office: [address].]   Delivery address correct? Yes   Preferred delivery time? Anytime   Number of medications in delivery 1   Medication being filled and delivered Botox   Doses left of specialty medications None. Administered in provider's office.   Is there any medication that is due not being filled? No   Supplies needed? No supplies needed   Cooler needed? Yes   Do any medications need mixed or dated? No   Copay form of payment Credit card on file   Questions or concerns for the pharmacist? No   Are any medications first time fills? No            Medication Adherence     Adherence tools used: calendar  Support network for adherence: healthcare provider          Follow-up:  12 weeks.     Catalina Shukla, Pharmacy Technician  Specialty Pharmacy Technician

## 2022-07-15 ENCOUNTER — PROCEDURE VISIT (OUTPATIENT)
Dept: NEUROLOGY | Facility: CLINIC | Age: 42
End: 2022-07-15

## 2022-07-15 DIAGNOSIS — G43.009 MIGRAINE WITHOUT AURA AND WITHOUT STATUS MIGRAINOSUS, NOT INTRACTABLE: Primary | ICD-10-CM

## 2022-07-15 PROCEDURE — 64615 CHEMODENERV MUSC MIGRAINE: CPT | Performed by: NURSE PRACTITIONER

## 2022-07-15 RX ORDER — OXCARBAZEPINE 300 MG/1
TABLET, FILM COATED ORAL
Qty: 60 TABLET | Refills: 2 | Status: SHIPPED | OUTPATIENT
Start: 2022-07-15 | End: 2022-09-28

## 2022-07-15 NOTE — PROGRESS NOTES
Botox Procedure Note       Patient Name: Ivette Hughes  : 1980   MRN: [id number]   PCP: Dr Macias  Chief Complaint:    Chief Complaint   Patient presents with   • Botulinum Toxin Injection       History of Present Illness: Ivette Hughes is a 41 y.o. female who is here today for Botox injections for migraines.     Headaches are not controlled. In ER and PCP office. The only med that helps is hydrocodone and migraine cocktail      Abortives: Imitrex, Maxalt, Nurtec-ineffective, fioricet,  lortabs, toradol, Aleve, IBU, excedrin, Tylenol  Preventives:Emgality x2 years, stopped due ineffective Elavil-ineffective, Topamax-brain fog,  nurtec qod            We have discussed risk and benefits of this Botox procedure and common side effects including headache, bleeding, infection, worsening of pain, neck pain, neck stiffness or weakness, damage to the areas being injected, weakness of muscles, loss of muscle control, ptosis, flu-like symptoms as well as more serious possible adverse effects including possible dysphagia, facial droop if injecting the facial area, painful injection, respiratory distress or even death (death has only been reported once with adults for Botox for migraines in another state when mixed with lidocaine solution which we do not use lidocaine solution in our practice for mixing Botox). The patient verbally understands and accepts risks and agrees with moving forward with Botox injections for chronic migraine prevention.    Verbal and written consent has been obtained from the patient prior to beginning treatment injections.     Pertinent Medical History:  Response to treatment has reduced headaches at least 7 fewer days a month and / or 100 hours fewer each month.       Subjective        The following portions of the patient's history were reviewed an updated as appropriate: past family history, past medical history, past social history, past surgical history.     Medications:      Current Outpatient Medications:   •  fluticasone (FLONASE) 50 MCG/ACT nasal spray, 1-2 sprays into the nostril(s) as directed by provider., Disp: , Rfl:   •  HYDROcodone-acetaminophen (NORCO) 5-325 MG per tablet, Take 1 tablet by mouth Every 4 (Four) Hours As Needed for Moderate Pain ., Disp: 18 tablet, Rfl: 0  •  ibuprofen (ADVIL,MOTRIN) 800 MG tablet, Take 1 tablet by mouth Every 8 (Eight) Hours., Disp: , Rfl:   •  methylPREDNISolone (MEDROL) 4 MG dose pack, Take as directed on package instructions., Disp: 21 tablet, Rfl: 0  •  metroNIDAZOLE (Flagyl) 500 MG tablet, Take 1 tablet by mouth 2 (Two) Times a Day., Disp: 14 tablet, Rfl: 0  •  OnabotulinumtoxinA 200 units reconstituted solution, FOR . PHYSICIAN TO INJECT 155 UNITS INTRAMUSCULARLY INTO HEAD, NECK AND SHOULDERS EVERY 12 WEEKS Per FDA PROTOCOL, Disp: 1 each, Rfl: 3  •  sertraline (ZOLOFT) 100 MG tablet, TAKE 1.5 TABLET BY MOUTH DAILY, Disp: , Rfl:   •  traZODone (DESYREL) 100 MG tablet, TAKE ONE TABLET BY MOUTH TWICE A DAY AFTER MEALS, Disp: 180 tablet, Rfl: 1    Current Facility-Administered Medications:   •  OnabotulinumtoxinA 200 Units, 200 Units, Intramuscular, Q3 Months, Kyra Vázquez, DNP, APRN, 200 Units at 04/21/22 1700    Allergies:   No Known Allergies    Objective     Physical Exam:  Vital Signs: There were no vitals filed for this visit.  There is no height or weight on file to calculate BMI.     Physical Exam  Alert and oriented x 3     BOTOX PROCEDURE:     The patient was placed in a comfortable area and the sites to be treated were identified. A time out was called and performed. The area to be treated was prepped three times with alcohol and the alcohol allowed to dry. Next, a 30 gauge needle was used to inject the medication in the area to be treated.     Date of Last Injection: 4/21/22  Response: good  Onset of response: immediate   Wearing off: none  Side Effects: none  : [employer]  Lot #:  [id number]  Expiration Date: 01/2025  NDC: 92103wu22     Botox is pt supplied    Botox was injected using FDA approved protocol for chronic migraine prevention.   200 unit vial Botox was re-constituted with 4 mL 0.9 NS to 5 unit/0.1 mL using standard techniques.  10 units were given at the  muscle in 2 divided sites  5 units were given at the Procerus muscle  20 units were given at the Frontalis muscle in 4 divided sites  40 units were given at the Temporalis muscle in 8 divided sites  30 units were given at the Occipitalis muscle in 6 divided sites  20 units were given at the Cervical paraspinal muscle in 4 divided sites  30 units were given at the Trapezius muscle in 6 divided sites    The total amount injected in units is 155  The total amount wasted in units is 45  The total amount submitted in units is 200.   Botox was administered by Nurse practitioner.     Patient tolerated procedure well with no immediate complications.     Assessment / Plan      Assessment/Plan:   Diagnoses and all orders for this visit:    1. Migraine without aura and without status migrainosus, not intractable (Primary)  Comments:  Cont Botox. Add OXC.         Patient Education:       Reviewed medications, potential side effects and signs and symptoms to report. Discussed risk versus benefits of treatment plan with patient and/or family-including medications, labs and radiology that may be ordered. Addressed questions and concerns during visit. Patient and/or family verbalized understanding and agree with plan. Instructed to call the office with any questions and report to ER with any life-threatening symptoms.     Follow Up:   It has been determined that [unfilled] has chronic migraine headaches. We will proceed with a 12 week regimen of 200 units of Botox injections, to treat the patient's chronic migraines.  Return in about 3 months (around 10/15/2022).    During this visit the following were done:  Labs Reviewed []     Labs Ordered []    Radiology Reports Reviewed []    Radiology Ordered []    PCP Records Reviewed []    Referring Provider Records Reviewed []    ER Records Reviewed []    Hospital Records Reviewed []    History Obtained From Family []    Radiology Images Reviewed []    Other Reviewed []    Records Requested []        Kyra Vázquez, DNP, APRN

## 2022-08-02 ENCOUNTER — CLINICAL SUPPORT (OUTPATIENT)
Dept: FAMILY MEDICINE CLINIC | Facility: CLINIC | Age: 42
End: 2022-08-02

## 2022-08-02 DIAGNOSIS — G43.711 INTRACTABLE CHRONIC MIGRAINE WITHOUT AURA AND WITH STATUS MIGRAINOSUS: ICD-10-CM

## 2022-08-02 DIAGNOSIS — G43.719 INTRACTABLE CHRONIC MIGRAINE WITHOUT AURA AND WITHOUT STATUS MIGRAINOSUS: Primary | ICD-10-CM

## 2022-08-02 PROCEDURE — 96372 THER/PROPH/DIAG INJ SC/IM: CPT | Performed by: FAMILY MEDICINE

## 2022-08-02 RX ORDER — KETOROLAC TROMETHAMINE 30 MG/ML
60 INJECTION, SOLUTION INTRAMUSCULAR; INTRAVENOUS ONCE
Status: COMPLETED | OUTPATIENT
Start: 2022-08-02 | End: 2022-08-02

## 2022-08-02 RX ORDER — TRIAMCINOLONE ACETONIDE 40 MG/ML
80 INJECTION, SUSPENSION INTRA-ARTICULAR; INTRAMUSCULAR ONCE
Status: COMPLETED | OUTPATIENT
Start: 2022-08-02 | End: 2022-08-02

## 2022-08-02 RX ADMIN — KETOROLAC TROMETHAMINE 60 MG: 30 INJECTION, SOLUTION INTRAMUSCULAR; INTRAVENOUS at 15:55

## 2022-08-02 RX ADMIN — TRIAMCINOLONE ACETONIDE 80 MG: 40 INJECTION, SUSPENSION INTRA-ARTICULAR; INTRAMUSCULAR at 15:57

## 2022-09-08 ENCOUNTER — DOCUMENTATION (OUTPATIENT)
Dept: ONCOLOGY | Facility: HOSPITAL | Age: 42
End: 2022-09-08

## 2022-09-28 ENCOUNTER — SPECIALTY PHARMACY (OUTPATIENT)
Dept: ONCOLOGY | Facility: HOSPITAL | Age: 42
End: 2022-09-28

## 2022-09-28 RX ORDER — SERTRALINE HYDROCHLORIDE 100 MG/1
150 TABLET, FILM COATED ORAL DAILY
Qty: 135 TABLET | Refills: 3 | Status: SHIPPED | OUTPATIENT
Start: 2022-09-28

## 2022-09-28 RX ORDER — TRAZODONE HYDROCHLORIDE 100 MG/1
200 TABLET ORAL NIGHTLY
Qty: 180 TABLET | Refills: 1 | Status: SHIPPED | OUTPATIENT
Start: 2022-09-28

## 2022-09-28 RX ORDER — FLUTICASONE PROPIONATE 50 MCG
1-2 SPRAY, SUSPENSION (ML) NASAL DAILY
Qty: 18.2 ML | Refills: 11 | Status: SHIPPED | OUTPATIENT
Start: 2022-09-28

## 2022-09-28 NOTE — PROGRESS NOTES
Specialty Pharmacy Refill Coordination Note     Ivette is a 42 y.o. female contacted today regarding refills of Botox specialty medication(s). Patient due Botox in provider's office on 10/10/2022. Shipped from [facility] to provider's office.    Reviewed and verified with patient: Yes  Specialty medication(s) and dose(s) confirmed: yes    Refill Questions    Flowsheet Row Most Recent Value   Changes to allergies? No   Changes to medications? No   New conditions since last clinic visit No   Unplanned office visit, urgent care, ED, or hospital admission in the last 4 weeks  No   How does patient/caregiver feel medication is working? Good   Financial problems or insurance changes  No   If yes, describe changes in insurance or financial issues. N/A   Since the previous refill, were any specialty medication doses or scheduled injections missed or delayed?  No   If yes, please provide the amount N/A   Why were doses missed? N/A   Does this patient require a clinical escalation to a pharmacist? No          Delivery Questions    Flowsheet Row Most Recent Value   Delivery method FedEx  [Ship Botox to Dr. Nuñez's office: [address] [place] [address] 201.]   Delivery address correct? Yes   Preferred delivery time? Anytime   Number of medications in delivery 1   Medication being filled and delivered Botox   Doses left of specialty medications None. Administered in provider's office.   Is there any medication that is due not being filled? No   Supplies needed? No supplies needed   Cooler needed? Yes   Do any medications need mixed or dated? No   Copay form of payment Credit card on file   Questions or concerns for the pharmacist? No   Are any medications first time fills? No            Medication Adherence    Adherence tools used: calendar  Support network for adherence: healthcare provider          Follow-up:  12 weeks.     Catalina Shukla, Pharmacy Technician  Specialty Pharmacy Technician

## 2022-09-28 NOTE — PROGRESS NOTES
Specialty Pharmacy Patient Management Program  Neurology Reassessment     Ivette Hughes is a 42 y.o. female with migraines seen by a Neurology provider and enrolled in the Neurology Patient Management program offered by [facility].  A follow-up outreach was conducted, including assessment of continued therapy appropriateness, medication adherence, and side effect incidence and management for  Botox.     Changes to Insurance Coverage or Financial Support  [employer], Botox co-pay card.     Relevant Past Medical History and Comorbidities  Relevant medical history and concomitant health conditions were discussed with the patient. The patient's chart has been reviewed for relevant past medical history and comorbid health conditions and updated as necessary.   Past Medical History:   Diagnosis Date   • Anxiety    • Cluster headache    • Gall stones    • Insomnia    • Kidney stones    • Migraine headache    • Panic attack    • Tension headache      Social History     Socioeconomic History   • Marital status:    Tobacco Use   • Smoking status: Never Smoker   • Smokeless tobacco: Never Used   Vaping Use   • Vaping Use: Never used   Substance and Sexual Activity   • Alcohol use: Never   • Drug use: Never   • Sexual activity: Defer       Problem list reviewed by Nova Forman PharmD on 9/28/2022 at 10:57 AM    Allergies  Known allergies and reactions were discussed with the patient. The patient's chart has been reviewed for allergy information and updated as necessary.   Patient has no known allergies.    Allergies reviewed by Nova Forman PharmD on 9/28/2022 at 10:54 AM    Relevant Laboratory Values          Current Medication List  This medication list has been reviewed with the patient and evaluated for any interactions or necessary modifications/recommendations, and updated to include all prescription medications, OTC medications, and supplements the patient is currently taking.   This list reflects what is contained in the patient's profile, which has also been marked as reviewed to communicate to other providers it is the most up to date version of the patient's current medication therapy.     Current Outpatient Medications:   •  fluticasone (FLONASE) 50 MCG/ACT nasal spray, 1-2 sprays into the nostril(s) as directed by provider Daily., Disp: 18.2 mL, Rfl: 11  •  ibuprofen (ADVIL,MOTRIN) 800 MG tablet, Take 1 tablet by mouth Every 8 (Eight) Hours., Disp: , Rfl:   •  OnabotulinumtoxinA 200 units reconstituted solution, FOR . PHYSICIAN TO INJECT 155 UNITS INTRAMUSCULARLY INTO HEAD, NECK AND SHOULDERS EVERY 12 WEEKS Per FDA PROTOCOL, Disp: 1 each, Rfl: 3  •  sertraline (ZOLOFT) 100 MG tablet, Take 1.5 tablets by mouth Daily., Disp: 135 tablet, Rfl: 3  •  traZODone (DESYREL) 100 MG tablet, Take 2 tablets by mouth Every Night., Disp: 180 tablet, Rfl: 1    Current Facility-Administered Medications:   •  OnabotulinumtoxinA 200 Units, 200 Units, Intramuscular, Q3 Months, Kyra Vázquez, DNP, APRN, 200 Units at 07/15/22 1403    Medicines reviewed by Nova Forman, PharmD on 9/28/2022 at 10:56 AM    Drug Interactions  none     Adverse Drug Reactions  • Adverse Reactions Experienced: none   • Plan for ADR Management: not required    Hospitalizations and Urgent Care Since Last Assessment  • Hospitalizations or Admissions: none   • ED Visits: none  • Urgent Office Visits: none     Adherence and Self-Administration  Adherence related patient's specialty therapy was discussed with the patient. The Adherence segment of this outreach has been reviewed and updated.     Medication Adherence    Adherence tools used: calendar  Support network for adherence: healthcare provider        • Ongoing or New Barriers to Patient Adherence and/or Self-Administration: none   • Methods for Supporting Patient Adherence and/or Self-Administration: none     Goals of Therapy  Goals related to the  patient's specialty therapy was discussed with the patient. The Patient Goals segment of this outreach has been reviewed and updated.    Goals     • Specialty Pharmacy General Goal      Reduction in severity and frequency of migraines.             Quality of Life Assessment   Quality of Life related to the patient's specialty therapy was discussed with the patient. The QOL segment of this outreach has been reviewed and updated.     Quality of Life Assessment  Quality of Life Improvement Scale: Somewhat better    Reassessment Plan & Follow-Up  1. Medication Therapy Changes: none  2. Additional Plans, Therapy Recommendations, or Therapy Problems to Be Addressed: none  3. Pharmacist to perform regular reassessments no more than (6) months from the previous assessment.  4. Care Coordinator to set up future refill outreaches, coordinate prescription delivery, and escalate clinical questions to pharmacist.     Attestation  I attest that the specialty medication(s) addressed above are appropriate for the patient based on my reassessment.  If the prescribed therapy is at any point deemed not appropriate based on the current or future assessments, a consultation will be initiated with the patient's specialty care provider to determine the best course of action. The revised plan of therapy will be documented along with any additional patient education provided.     Nova Forman, PharmD  9/28/2022  10:58 EDT

## 2022-10-10 ENCOUNTER — PROCEDURE VISIT (OUTPATIENT)
Dept: NEUROLOGY | Facility: CLINIC | Age: 42
End: 2022-10-10

## 2022-10-10 DIAGNOSIS — G43.009 MIGRAINE WITHOUT AURA AND WITHOUT STATUS MIGRAINOSUS, NOT INTRACTABLE: Primary | ICD-10-CM

## 2022-10-10 PROCEDURE — 64615 CHEMODENERV MUSC MIGRAINE: CPT | Performed by: NURSE PRACTITIONER

## 2022-10-10 NOTE — PROGRESS NOTES
Botox Procedure Note       Patient Name: Ivette Hughes  : 1980   MRN: [id number]   PCP: Dr Macias  Chief Complaint:    Chief Complaint   Patient presents with   • Botulinum Toxin Injection       History of Present Illness: Ivette Hughes is a 42 y.o. female who is here today for Botox injections for migraines.     Abortives: Imitrex, Maxalt, Nurtec-ineffective, fioricet,  lortabs, toradol, Aleve, IBU, excedrin, Tylenol, Ubrelvy  Preventives:Emgality x2 years, stopped due ineffective Elavil-ineffective, Topamax-brain fog,  nurtec qod, OXC          The only med that helps is hydrocodone and migraine cocktail    We have discussed risk and benefits of this Botox procedure and common side effects including headache, bleeding, infection, worsening of pain, neck pain, neck stiffness or weakness, damage to the areas being injected, weakness of muscles, loss of muscle control, ptosis, flu-like symptoms as well as more serious possible adverse effects including possible dysphagia, facial droop if injecting the facial area, painful injection, respiratory distress or even death (death has only been reported once with adults for Botox for migraines in another state when mixed with lidocaine solution which we do not use lidocaine solution in our practice for mixing Botox). The patient verbally understands and accepts risks and agrees with moving forward with Botox injections for chronic migraine prevention.    Verbal and written consent has been obtained from the patient prior to beginning treatment injections.     Pertinent Medical History:  Response to treatment has reduced headaches at least 7 fewer days a month and / or 100 hours fewer each month.       Subjective        The following portions of the patient's history were reviewed an updated as appropriate: past family history, past medical history, past social history, past surgical history.     Medications:     Current Outpatient Medications:   •   fluticasone (FLONASE) 50 MCG/ACT nasal spray, 1-2 sprays into the nostril(s) as directed by provider Daily., Disp: 18.2 mL, Rfl: 11  •  ibuprofen (ADVIL,MOTRIN) 800 MG tablet, Take 1 tablet by mouth Every 8 (Eight) Hours., Disp: , Rfl:   •  OnabotulinumtoxinA 200 units reconstituted solution, FOR . PHYSICIAN TO INJECT 155 UNITS INTRAMUSCULARLY INTO HEAD, NECK AND SHOULDERS EVERY 12 WEEKS Per FDA PROTOCOL, Disp: 1 each, Rfl: 3  •  sertraline (ZOLOFT) 100 MG tablet, Take 1.5 tablets by mouth Daily., Disp: 135 tablet, Rfl: 3  •  traZODone (DESYREL) 100 MG tablet, Take 2 tablets by mouth Every Night., Disp: 180 tablet, Rfl: 1    Current Facility-Administered Medications:   •  OnabotulinumtoxinA 200 Units, 200 Units, Intramuscular, Q3 Months, Kyra Vázquez, DNP, APRN, 200 Units at 07/15/22 1403    Allergies:   No Known Allergies    Objective     Physical Exam:  Vital Signs: There were no vitals filed for this visit.  There is no height or weight on file to calculate BMI.     Physical Exam  Alert and oriented x 3    BOTOX PROCEDURE:     The patient was placed in a comfortable area and the sites to be treated were identified. A time out was called and performed. The area to be treated was prepped three times with alcohol and the alcohol allowed to dry. Next, a 30 gauge needle was used to inject the medication in the area to be treated.     Date of Last Injection: 7/15/22 w me  Response: good  Onset of response: immediate   Wearing off: none  Side Effects: none  : Zextit  Lot #: [id number]  Expiration Date: 03/2025  NDC: 40476is63     Botox is pt supplied    Botox was injected using FDA approved protocol for chronic migraine prevention.   200 unit vial Botox was re-constituted with 4 mL 0.9 NS to 5 unit/0.1 mL using standard techniques.  10 units were given at the  muscle in 2 divided sites  5 units were given at the Procerus muscle  20 units were given at the Frontalis  muscle in 4 divided sites  40 units were given at the Temporalis muscle in 8 divided sites  30 units were given at the Occipitalis muscle in 6 divided sites  20 units were given at the Cervical paraspinal muscle in 4 divided sites  30 units were given at the Trapezius muscle in 6 divided sites    The total amount injected in units is 155  The total amount wasted in units is 45  The total amount submitted in units is 200.   Botox was administered by Nurse practitioner.     Patient tolerated procedure well with no immediate complications.     Assessment / Plan      Assessment/Plan:   Diagnoses and all orders for this visit:    1. Migraine without aura and without status migrainosus, not intractable (Primary)  Comments:  Cont botox         Patient Education:       Reviewed medications, potential side effects and signs and symptoms to report. Discussed risk versus benefits of treatment plan with patient and/or family-including medications, labs and radiology that may be ordered. Addressed questions and concerns during visit. Patient and/or family verbalized understanding and agree with plan. Instructed to call the office with any questions and report to ER with any life-threatening symptoms.     Follow Up:   It has been determined that [unfilled] has chronic migraine headaches. We will proceed with a 12 week regimen of 200 units of Botox injections, to treat the patient's chronic migraines.  No follow-ups on file.    During this visit the following were done:  Labs Reviewed []    Labs Ordered []    Radiology Reports Reviewed []    Radiology Ordered []    PCP Records Reviewed []    Referring Provider Records Reviewed []    ER Records Reviewed []    Hospital Records Reviewed []    History Obtained From Family []    Radiology Images Reviewed []    Other Reviewed []    Records Requested []        Kyra Vázquez, DNP, APRN

## 2022-11-25 ENCOUNTER — DOCUMENTATION (OUTPATIENT)
Dept: FAMILY MEDICINE CLINIC | Facility: CLINIC | Age: 42
End: 2022-11-25

## 2022-12-12 RX ORDER — PROMETHAZINE HYDROCHLORIDE 25 MG/1
25 SUPPOSITORY RECTAL EVERY 6 HOURS PRN
Qty: 6 SUPPOSITORY | Refills: 0 | Status: SHIPPED | OUTPATIENT
Start: 2022-12-12

## 2022-12-19 ENCOUNTER — SPECIALTY PHARMACY (OUTPATIENT)
Dept: ONCOLOGY | Facility: HOSPITAL | Age: 42
End: 2022-12-19

## 2022-12-19 NOTE — PROGRESS NOTES
Specialty Pharmacy Refill Coordination Note     Ivette is a 42 y.o. female contacted today regarding refills of  Botox specialty medication(s). Patient has appointment scheduled 1/4.     Reviewed and verified with patient:       Specialty medication(s) and dose(s) confirmed: yes    Refill Questions    Flowsheet Row Most Recent Value   Changes to allergies? No   Changes to medications? No   New conditions since last clinic visit No   Unplanned office visit, urgent care, ED, or hospital admission in the last 4 weeks  No   How does patient/caregiver feel medication is working? Very good   Financial problems or insurance changes  No   If yes, describe changes in insurance or financial issues. N/A   Since the previous refill, were any specialty medication doses or scheduled injections missed or delayed?  No   If yes, please provide the amount N/A   Why were doses missed? N/A   Does this patient require a clinical escalation to a pharmacist? No          Delivery Questions    Flowsheet Row Most Recent Value   Delivery method FedEx   Delivery address correct? Yes   Preferred delivery time? Anytime   Number of medications in delivery 1   Medication being filled and delivered B[facility]   Doses left of specialty medications 0   Is there any medication that is due not being filled? No   Supplies needed? No supplies needed   Cooler needed? Yes   Do any medications need mixed or dated? No   Copay form of payment Payment plan already set up   Questions or concerns for the pharmacist? No   Are any medications first time fills? No            Medication Adherence    Adherence tools used: calendar  Support network for adherence: healthcare provider          Follow-up: 3 month(s)     Ne Caraballo, Pharmacy Technician  Specialty Pharmacy Technician

## 2023-01-03 ENCOUNTER — TELEPHONE (OUTPATIENT)
Dept: NEUROLOGY | Facility: CLINIC | Age: 43
End: 2023-01-03
Payer: COMMERCIAL

## 2023-01-03 NOTE — TELEPHONE ENCOUNTER
Patient had a hysterectomy and has her 6 week follow up tomorrow and will not be able to make it an hour away tomorrow so as we had a cancellation the following day, Thursday 1/5/23 @ 3pm r/s'd her to the next day for her Botox. Thanks!

## 2023-01-03 NOTE — TELEPHONE ENCOUNTER
Caller: Ivette Hughes    Relationship: Self    Best call back number: [phone]    What was the call regarding: PT CALLED TO RESCHEDULE HER BOTOX APPT W/ EDITH CHACON ON 1/4/23. PT HAS CONFLICTING POST-OP APPT THIS DAY.    Do you require a callback: YES, PLEASE CONTACT PT TO RESCHEDULE.    PLEASE REVIEW AND ADVISE.

## 2023-01-05 ENCOUNTER — PROCEDURE VISIT (OUTPATIENT)
Dept: NEUROLOGY | Facility: CLINIC | Age: 43
End: 2023-01-05
Payer: COMMERCIAL

## 2023-01-05 DIAGNOSIS — G43.009 MIGRAINE WITHOUT AURA AND WITHOUT STATUS MIGRAINOSUS, NOT INTRACTABLE: Primary | ICD-10-CM

## 2023-01-05 PROCEDURE — 64615 CHEMODENERV MUSC MIGRAINE: CPT | Performed by: NURSE PRACTITIONER

## 2023-01-05 RX ORDER — ESTRADIOL 0.1 MG/D
FILM, EXTENDED RELEASE TRANSDERMAL
COMMUNITY
Start: 2022-12-14

## 2023-01-05 NOTE — PROGRESS NOTES
Botox Procedure Note       Patient Name: Ivette Hughes  : 1980   MRN: [id number]     Chief Complaint:    Chief Complaint   Patient presents with   • Botulinum Toxin Injection       History of Present Illness: Ivette Hughes is a 42 y.o. female who is here today for Botox injections for migraines.    Had MIRIAM at the end of Nov.  Headaches returned 2 weeks ago.    We have discussed risk and benefits of this Botox procedure and common side effects including headache, bleeding, infection, worsening of pain, neck pain, neck stiffness or weakness, damage to the areas being injected, weakness of muscles, loss of muscle control, ptosis, flu-like symptoms as well as more serious possible adverse effects including possible dysphagia, facial droop if injecting the facial area, painful injection, respiratory distress or even death (death has only been reported once with adults for Botox for migraines in another state when mixed with lidocaine solution which we do not use lidocaine solution in our practice for mixing Botox). The patient verbally understands and accepts risks and agrees with moving forward with Botox injections for chronic migraine prevention.    Verbal and written consent has been obtained from the patient prior to beginning treatment injections.     Pertinent Medical History:  Response to treatment has reduced headaches at least 7 fewer days a month and / or 100 hours fewer each month.     Subjective        The following portions of the patient's history were reviewed an updated as appropriate: past family history, past medical history, past social history, past surgical history.     Medications:     Current Outpatient Medications:   •  estradiol (VIVELLE-DOT) 0.1 MG/24HR patch, , Disp: , Rfl:   •  fluticasone (FLONASE) 50 MCG/ACT nasal spray, 1-2 sprays into the nostril(s) as directed by provider Daily., Disp: 18.2 mL, Rfl: 11  •  ibuprofen (ADVIL,MOTRIN) 800 MG tablet, Take 1 tablet by mouth Every 8  (Eight) Hours., Disp: , Rfl:   •  OnabotulinumtoxinA 200 units reconstituted solution, FOR . PHYSICIAN TO INJECT 155 UNITS INTRAMUSCULARLY INTO HEAD, NECK AND SHOULDERS EVERY 12 WEEKS Per FDA PROTOCOL, Disp: 1 each, Rfl: 3  •  promethazine (PHENERGAN) 25 MG suppository, Insert 1 suppository into the rectum Every 6 (Six) Hours As Needed for Nausea or Vomiting., Disp: 6 suppository, Rfl: 0  •  sertraline (ZOLOFT) 100 MG tablet, Take 1.5 tablets by mouth Daily., Disp: 135 tablet, Rfl: 3  •  traZODone (DESYREL) 100 MG tablet, Take 2 tablets by mouth Every Night., Disp: 180 tablet, Rfl: 1    Current Facility-Administered Medications:   •  OnabotulinumtoxinA 200 Units, 200 Units, Intramuscular, Q3 Months, Kyra Vázquez, JOHANNA, APRN, 200 Units at 10/10/22 0810    Allergies:   No Known Allergies    Objective     Physical Exam:  Vital Signs: There were no vitals filed for this visit.  There is no height or weight on file to calculate BMI.     Physical Exam  Alert and oriented x 3    BOTOX PROCEDURE:     The patient was placed in a comfortable area and the sites to be treated were identified. A time out was called and performed. The area to be treated was prepped three times with alcohol and the alcohol allowed to dry. Next, a 30 gauge needle was used to inject the medication in the area to be treated.     Date of Last Injection: 10/10/22 w me  Response: good  Onset of response:  1 week   Wearing off:  2 weeks  Side Effects: none  :  [employer]  Lot #: [id number]  Expiration Date: 05/2025  NDC: [id number]     Botox is patient supplied      Botox was injected using FDA approved protocol for chronic migraine prevention.   200 unit vial Botox was re-constituted with 4 mL 0.9 NS to 5 unit/0.1 mL using standard techniques.  10 units were given at the  muscle in 2 divided sites  5 units were given at the Procerus muscle  20 units were given at the Frontalis muscle in 4 divided sites  40  units were given at the Temporalis muscle in 8 divided sites  30 units were given at the Occipitalis muscle in 6 divided sites  20 units were given at the Cervical paraspinal muscle in 4 divided sites  30 units were given at the Trapezius muscle in 6 divided sites    The total amount injected in units is 155  The total amount wasted in units is 45  The total amount submitted in units is 200.   Botox was administered by Nurse practitioner.     Patient tolerated procedure well with no immediate complications.     Assessment / Plan      Assessment/Plan:   Diagnoses and all orders for this visit:    1. Migraine without aura and without status migrainosus, not intractable (Primary)  Comments:  Cont Botox         Patient Education:     Reviewed medications, potential side effects and signs and symptoms to report. Discussed risk versus benefits of treatment plan with patient and/or family-including medications, labs and radiology that may be ordered. Addressed questions and concerns during visit. Patient and/or family verbalized understanding and agree with plan. Instructed to call the office with any questions and report to ER with any life-threatening symptoms.     Follow Up:   It has been determined that this patient has chronic migraine headaches. We will proceed with a 12 week regimen of 200 units of Botox injections, to treat the patient's chronic migraines.      3 months    During this visit the following were done:  Labs Reviewed []    Labs Ordered []    Radiology Reports Reviewed []    Radiology Ordered []    PCP Records Reviewed []    Referring Provider Records Reviewed []    ER Records Reviewed []    Hospital Records Reviewed []    History Obtained From Family []    Radiology Images Reviewed []    Other Reviewed []    Records Requested []        Kyra Vázquez, DNP, APRN

## 2023-01-09 RX ORDER — NITROFURANTOIN 25; 75 MG/1; MG/1
100 CAPSULE ORAL 2 TIMES DAILY
Qty: 14 CAPSULE | Refills: 0 | Status: SHIPPED | OUTPATIENT
Start: 2023-01-09

## 2023-01-11 RX ORDER — HYDROCORTISONE ACETATE 25 MG/1
25 SUPPOSITORY RECTAL 2 TIMES DAILY
Qty: 24 EACH | Refills: 1 | Status: SHIPPED | OUTPATIENT
Start: 2023-01-11

## 2023-01-11 RX ORDER — CEPHALEXIN 500 MG/1
500 CAPSULE ORAL 3 TIMES DAILY
Qty: 21 CAPSULE | Refills: 0 | Status: SHIPPED | OUTPATIENT
Start: 2023-01-11 | End: 2023-03-21

## 2023-03-02 ENCOUNTER — DOCUMENTATION (OUTPATIENT)
Dept: FAMILY MEDICINE CLINIC | Facility: CLINIC | Age: 43
End: 2023-03-02
Payer: COMMERCIAL

## 2023-03-02 RX ORDER — IBUPROFEN 800 MG/1
TABLET ORAL
Qty: 90 TABLET | Refills: 5 | Status: SHIPPED | OUTPATIENT
Start: 2023-03-02

## 2023-03-11 ENCOUNTER — DOCUMENTATION (OUTPATIENT)
Dept: FAMILY MEDICINE CLINIC | Facility: CLINIC | Age: 43
End: 2023-03-11
Payer: COMMERCIAL

## 2023-03-11 RX ORDER — PROMETHAZINE HYDROCHLORIDE 25 MG/1
25 SUPPOSITORY RECTAL EVERY 6 HOURS PRN
Qty: 30 SUPPOSITORY | Refills: 1 | Status: SHIPPED | OUTPATIENT
Start: 2023-03-11

## 2023-03-20 ENCOUNTER — SPECIALTY PHARMACY (OUTPATIENT)
Dept: ONCOLOGY | Facility: HOSPITAL | Age: 43
End: 2023-03-20
Payer: COMMERCIAL

## 2023-03-21 ENCOUNTER — TELEPHONE (OUTPATIENT)
Dept: NEUROLOGY | Facility: CLINIC | Age: 43
End: 2023-03-21
Payer: COMMERCIAL

## 2023-03-27 ENCOUNTER — TELEPHONE (OUTPATIENT)
Dept: NEUROLOGY | Facility: CLINIC | Age: 43
End: 2023-03-27
Payer: COMMERCIAL

## 2023-03-27 NOTE — TELEPHONE ENCOUNTER
Provider: BENI  Caller:ASHLI  Phone Number: [phone]  Reason for Call: PT CALLED AND IS NEEDING TO RESCHEDULE HER BOTOX APPT ON 04/06/23.    PLEASE REVIEW AND ADVISE.  THANK YOU

## 2023-03-27 NOTE — TELEPHONE ENCOUNTER
Called and rescheduled patient's appointment for 5-3.  Patient was understanding and appreciative.

## 2023-04-19 ENCOUNTER — SPECIALTY PHARMACY (OUTPATIENT)
Dept: ONCOLOGY | Facility: HOSPITAL | Age: 43
End: 2023-04-19
Payer: COMMERCIAL

## 2023-04-19 NOTE — PROGRESS NOTES
Specialty Pharmacy Refill Coordination Note     Ivette is a 42 y.o. female contacted today regarding refills of  Botox specialty medication(s). Patient is scheduled for an appointment on 5/3.     Reviewed and verified with patient:       Specialty medication(s) and dose(s) confirmed: yes    Refill Questions    Flowsheet Row Most Recent Value   Changes to allergies? No   Changes to medications? No   New conditions since last clinic visit No   Unplanned office visit, urgent care, ED, or hospital admission in the last 4 weeks  No   How does patient/caregiver feel medication is working? Excellent   Financial problems or insurance changes  No   Since the previous refill, were any specialty medication doses or scheduled injections missed or delayed?  No   Does this patient require a clinical escalation to a pharmacist? No          Delivery Questions    Flowsheet Row Most Recent Value   Delivery method FedEx   Delivery address correct? Yes   Preferred delivery time? Anytime   Number of medications in delivery 1   Medication being filled and delivered Botox-[facility]   Doses left of specialty medications 0   Is there any medication that is due not being filled? No   Supplies needed? No supplies needed   Cooler needed? Yes   Do any medications need mixed or dated? No   Copay form of payment Credit card on file   Questions or concerns for the pharmacist? No   Are any medications first time fills? No            Medication Adherence    Adherence tools used: calendar  Support network for adherence: healthcare provider          Follow-up: 84 day(s)     Ne Caraballo, Pharmacy Technician  Specialty Pharmacy Technician

## 2023-05-03 ENCOUNTER — PROCEDURE VISIT (OUTPATIENT)
Dept: NEUROLOGY | Facility: CLINIC | Age: 43
End: 2023-05-03
Payer: COMMERCIAL

## 2023-05-03 DIAGNOSIS — G43.009 MIGRAINE WITHOUT AURA AND WITHOUT STATUS MIGRAINOSUS, NOT INTRACTABLE: Primary | ICD-10-CM

## 2023-05-03 NOTE — PROGRESS NOTES
Botox Procedure Note       Patient Name: Ivette Hughes  : 1980   MRN: [id number]   PCP: Dr Macias  Chief Complaint:    Chief Complaint   Patient presents with   • Botulinum Toxin Injection       History of Present Illness: Ivette Hughes is a 42 y.o. female who is here today for Botox injections for migraines.     Abortives: Imitrex, Maxalt, Nurtec-ineffective, fioricet,  lortabs, toradol, Aleve, IBU, excedrin, Tylenol  Preventives:Emgality x2 years, stopped due ineffective Elavil-ineffective, Topamax-brain fog, propranolol       We have discussed risk and benefits of this Botox procedure and common side effects including headache, bleeding, infection, worsening of pain, neck pain, neck stiffness or weakness, damage to the areas being injected, weakness of muscles, loss of muscle control, ptosis, flu-like symptoms as well as more serious possible adverse effects including possible dysphagia, facial droop if injecting the facial area, painful injection, respiratory distress or even death (death has only been reported once with adults for Botox for migraines in another state when mixed with lidocaine solution which we do not use lidocaine solution in our practice for mixing Botox). The patient verbally understands and accepts risks and agrees with moving forward with Botox injections for chronic migraine prevention.    Verbal and written consent has been obtained from the patient prior to beginning treatment injections.     Pertinent Medical History:  Response to treatment has reduced headaches at least 7 fewer days a month and / or 100 hours fewer each month.       Subjective        The following portions of the patient's history were reviewed an updated as appropriate: past family history, past medical history, past social history, past surgical history.     Medications:     Current Outpatient Medications:   •  estradiol (VIVELLE-DOT) 0.1 MG/24HR patch, , Disp: , Rfl:   •  fluticasone (FLONASE) 50  MCG/ACT nasal spray, 1-2 sprays into the nostril(s) as directed by provider Daily., Disp: 18.2 mL, Rfl: 11  •  hydrocortisone (ANUSOL-HC) 25 MG suppository, Insert 1 suppository into the rectum 2 (Two) Times a Day., Disp: 24 each, Rfl: 1  •  ibuprofen (ADVIL,MOTRIN) 800 MG tablet, 1 tab PO Q8h PRN moderate pain, Disp: 90 tablet, Rfl: 5  •  nitrofurantoin, macrocrystal-monohydrate, (Macrobid) 100 MG capsule, Take 1 capsule by mouth 2 (Two) Times a Day., Disp: 14 capsule, Rfl: 0  •  OnabotulinumtoxinA 200 units reconstituted solution, FOR . PHYSICIAN TO INJECT 155 UNITS INTRAMUSCULARLY INTO HEAD, NECK AND SHOULDERS EVERY 12 WEEKS Per FDA PROTOCOL, Disp: 1 each, Rfl: 3  •  promethazine (PHENERGAN) 25 MG suppository, Insert 1 suppository into the rectum Every 6 (Six) Hours As Needed for Nausea or Vomiting., Disp: 6 suppository, Rfl: 0  •  promethazine (PHENERGAN) 25 MG suppository, Insert 1 suppository into the rectum Every 6 (Six) Hours As Needed for Nausea or Vomiting., Disp: 30 suppository, Rfl: 1  •  sertraline (ZOLOFT) 100 MG tablet, Take 1.5 tablets by mouth Daily., Disp: 135 tablet, Rfl: 3  •  traZODone (DESYREL) 100 MG tablet, Take 2 tablets by mouth Every Night., Disp: 180 tablet, Rfl: 1    Current Facility-Administered Medications:   •  OnabotulinumtoxinA 200 Units, 200 Units, Intramuscular, Q3 Months, Kyra Vázquez, DNP, APRN, 200 Units at 01/05/23 1403    Allergies:   No Known Allergies    Objective     Physical Exam:  Vital Signs: There were no vitals filed for this visit.  There is no height or weight on file to calculate BMI.     Physical Exam  Alert and oriented x 3    BOTOX PROCEDURE:     The patient was placed in a comfortable area and the sites to be treated were identified. A time out was called and performed. The area to be treated was prepped three times with alcohol and the alcohol allowed to dry. Next, a 30 gauge needle was used to inject the medication in the area to  be treated.     Date of Last Injection: 1/5/2023  Response: good  Onset of response:  1 week   Wearing off: none  Side Effects: none  : AllergOctoplus  Lot #: [id number]  Expiration Date: 09/2025  NDC: 38614vr47     Botox is pt supplied.    Botox was injected using FDA approved protocol for chronic migraine prevention.   200 unit vial Botox was re-constituted with 4 mL 0.9 NS to 5 unit/0.1 mL using standard techniques.  10 units were given at the  muscle in 2 divided sites  5 units were given at the Procerus muscle  20 units were given at the Frontalis muscle in 4 divided sites  40 units were given at the Temporalis muscle in 8 divided sites  30 units were given at the Occipitalis muscle in 6 divided sites  20 units were given at the Cervical paraspinal muscle in 4 divided sites  30 units were given at the Trapezius muscle in 6 divided sites  10 units were given at the Masseter muscle in 2 divided sties.    The total amount injected in units is 165  The total amount wasted in units is 35  The total amount submitted in units is 200.   Botox was administered by Nurse practitioner.     Patient tolerated procedure well with no immediate complications.     Assessment / Plan      Assessment/Plan:   Diagnoses and all orders for this visit:    1. Migraine without aura and without status migrainosus, not intractable (Primary)  Comments:  Cont botox. Add Qulipta.         Patient Education:       Reviewed medications, potential side effects and signs and symptoms to report. Discussed risk versus benefits of treatment plan with patient and/or family-including medications, labs and radiology that may be ordered. Addressed questions and concerns during visit. Patient and/or family verbalized understanding and agree with plan. Instructed to call the office with any questions and report to ER with any life-threatening symptoms.     Follow Up:   It has been determined that this patient has chronic migraine headaches.  We will proceed with a 12 week regimen of 200 units of Botox injections, to treat the patient's chronic migraines.      3 months  During this visit the following were done:  Labs Reviewed []    Labs Ordered []    Radiology Reports Reviewed []    Radiology Ordered []    PCP Records Reviewed []    Referring Provider Records Reviewed []    ER Records Reviewed []    Hospital Records Reviewed []    History Obtained From Family []    Radiology Images Reviewed []    Other Reviewed []    Records Requested []        Kyra Vázquez, DNP, APRN

## 2023-05-07 ENCOUNTER — SPECIALTY PHARMACY (OUTPATIENT)
Dept: ONCOLOGY | Facility: HOSPITAL | Age: 43
End: 2023-05-07
Payer: COMMERCIAL

## 2023-05-25 ENCOUNTER — DOCUMENTATION (OUTPATIENT)
Dept: FAMILY MEDICINE CLINIC | Facility: CLINIC | Age: 43
End: 2023-05-25
Payer: COMMERCIAL

## 2023-05-25 RX ORDER — METHYLPREDNISOLONE 4 MG/1
TABLET ORAL
Qty: 21 TABLET | Refills: 0 | Status: SHIPPED | OUTPATIENT
Start: 2023-05-25

## 2023-05-25 RX ORDER — TRAZODONE HYDROCHLORIDE 100 MG/1
TABLET ORAL
Qty: 180 TABLET | Refills: 1 | Status: SHIPPED | OUTPATIENT
Start: 2023-05-25

## 2023-06-15 ENCOUNTER — SPECIALTY PHARMACY (OUTPATIENT)
Dept: ONCOLOGY | Facility: HOSPITAL | Age: 43
End: 2023-06-15
Payer: COMMERCIAL

## 2023-06-15 ENCOUNTER — DOCUMENTATION (OUTPATIENT)
Dept: PHARMACY | Facility: HOSPITAL | Age: 43
End: 2023-06-15
Payer: COMMERCIAL

## 2023-06-15 RX ORDER — ERENUMAB-AOOE 140 MG/ML
140 INJECTION, SOLUTION SUBCUTANEOUS
Qty: 1 ML | Refills: 11 | Status: SHIPPED | OUTPATIENT
Start: 2023-06-15

## 2023-06-15 NOTE — PROGRESS NOTES
Specialty Pharmacy Refill Coordination Note     Ivette is a 42 y.o. female contacted today regarding refills of  Aimovig specialty medication(s).    Reviewed and verified with patient:  Allergies  Meds  Problems       Specialty medication(s) and dose(s) confirmed: yes        Delivery Questions    Flowsheet Row Most Recent Value   Delivery method FedEx   Delivery address correct? Yes   Preferred delivery time? Anytime   Number of medications in delivery 1   Medication being filled and delivered Aimovig   Doses left of specialty medications [facility]   Is there any medication that is due not being filled? No   Supplies needed? No supplies needed   Cooler needed? Yes   Do any medications need mixed or dated? No   Copay form of payment Credit card on file   Additional comments Aimovig=$5.00   Questions or concerns for the pharmacist? No   Are any medications first time fills? Yes            Medication Adherence    Adherence tools used: calendar  Support network for adherence: healthcare provider          Follow-up: 28 day(s)     Ne Caraballo, Pharmacy Technician  Specialty Pharmacy Technician

## 2023-06-15 NOTE — PROGRESS NOTES
Specialty Pharmacy Patient Management Program  Neurology Medication Addition Assessment     Ivette Hughes is a 42 y.o. female with migraines seen by a Neurology provider and enrolled in the Neurology Patient Management program offered by [facility].  This assessment was conducted as a result of a specialty medication addition or substitution. The patient was previously introduced to services offered by [facility], including: regular assessments, refill coordination, curbside pick-up or mail order delivery options, prior authorization maintenance, and financial assistance programs as applicable. The patient was also provided with contact information for the pharmacy team.     An initial outreach was conducted, including assessment of therapy appropriateness and specialty medication education for Aimovig.  Patient was given samples of Qulipta in office which was effective for migraine relief.  Patient's insurance required trial of Aimovig before Qulipta can be approved for formulary.    A follow-up outreach was conducted, including assessment of continued therapy appropriateness, medication adherence, and side effect incidence and management for Botox .    Insurance Coverage & Financial Support  [employer], Aimovig and Botox co-pay cards.    Relevant Past Medical History and Comorbidities  Relevant medical history and concomitant health conditions were discussed with the patient. The patient's chart has been reviewed for relevant past medical history and comorbid health conditions and updated as necessary.   Past Medical History:   Diagnosis Date   • Anxiety    • Cluster headache    • Gall stones    • Insomnia    • Kidney stones    • Migraine headache    • Panic attack    • Tension headache      Social History     Socioeconomic History   • Marital status:    Tobacco Use   • Smoking status: Never   • Smokeless tobacco: Never   Vaping Use   • Vaping Use: Never  used   Substance and Sexual Activity   • Alcohol use: Never   • Drug use: Never   • Sexual activity: Defer     Problem list reviewed by Nova Forman PharmD on 6/15/2023 at  2:22 PM    Hospitalizations and Urgent Care Since Last Assessment  • ED Visits, Admissions, or Hospitalizations: none   • Urgent Office Visits: none     Allergies  Known allergies and reactions were discussed with the patient. The patient's chart has been reviewed for  allergy information and updated as necessary.   No Known Allergies  Allergies reviewed by Nova Forman PharmD on 6/15/2023 at  2:20 PM    Relevant Laboratory Values      Current Medication List  This medication list has been reviewed with the patient and evaluated for any interactions or necessary modifications/recommendations, and updated to include all prescription medications, OTC medications, and supplements the patient is currently taking.  This list reflects what is contained in the patient's profile, which has also been marked as reviewed to communicate to other providers it is the most up to date version of the patient's current medication therapy.     Current Outpatient Medications:   •  Erenumab-aooe (Aimovig) 140 MG/ML auto-injector, Inject 1 mL under the skin into the appropriate area as directed Every 28 (Twenty-Eight) Days., Disp: 1 mL, Rfl: 11  •  estradiol (VIVELLE-DOT) 0.1 MG/24HR patch, , Disp: , Rfl:   •  fluticasone (FLONASE) 50 MCG/ACT nasal spray, 1-2 sprays into the nostril(s) as directed by provider Daily., Disp: 18.2 mL, Rfl: 11  •  hydrocortisone (ANUSOL-HC) 25 MG suppository, Insert 1 suppository into the rectum 2 (Two) Times a Day., Disp: 24 each, Rfl: 1  •  ibuprofen (ADVIL,MOTRIN) 800 MG tablet, 1 tab PO Q8h PRN moderate pain, Disp: 90 tablet, Rfl: 5  •  methylPREDNISolone (MEDROL) 4 MG dose pack, Take as directed on package instructions., Disp: 21 tablet, Rfl: 0  •  nitrofurantoin, macrocrystal-monohydrate, (Macrobid) 100 MG capsule, Take 1  capsule by mouth 2 (Two) Times a Day., Disp: 14 capsule, Rfl: 0  •  OnabotulinumtoxinA 200 units reconstituted solution, FOR . PHYSICIAN TO INJECT 155 UNITS INTRAMUSCULARLY INTO HEAD, NECK AND SHOULDERS EVERY 12 WEEKS Per FDA PROTOCOL, Disp: 1 each, Rfl: 3  •  promethazine (PHENERGAN) 25 MG suppository, Insert 1 suppository into the rectum Every 6 (Six) Hours As Needed for Nausea or Vomiting., Disp: 6 suppository, Rfl: 0  •  promethazine (PHENERGAN) 25 MG suppository, Insert 1 suppository into the rectum Every 6 (Six) Hours As Needed for Nausea or Vomiting., Disp: 30 suppository, Rfl: 1  •  sertraline (ZOLOFT) 100 MG tablet, Take 1.5 tablets by mouth Daily., Disp: 135 tablet, Rfl: 3  •  traZODone (DESYREL) 100 MG tablet, TAKE ONE TABLET BY MOUTH TWICE A DAY AFTER MEALS, Disp: 180 tablet, Rfl: 1    Current Facility-Administered Medications:   •  OnabotulinumtoxinA 200 Units, 200 Units, Intramuscular, Q3 Months, Kyra Vázquez, DNP, APRN, 200 Units at 05/04/23 0756    Medicines reviewed by Nova Forman, PharmD on 6/15/2023 at  2:22 PM    Drug Interactions  none     Initial Education Provided for Specialty Medication  The patient has been provided with the following education and any applicable administration techniques (i.e. self-injection) have been demonstrated for the therapies indicated. All questions and concerns have been addressed prior to the patient receiving the medication, and the patient has verbalized comprehension of the education and any materials provided.  Additional patient education shall be provided and documented upon request by the patient, provider or payer.                Aimovig (Erenumab-aoee)           Medication Expectations   Why am I taking this medication? You are taking this medication for migraine prophylaxis.   What should I expect while on this medication? You should expect to a decrease in the frequency and severity of your migraines.   How does the  medication work? Aimovig is a monoclonal antibody that binds to calcitonin gene-related peptide (CGRP) and blocks its binding to the receptor decreasing the severity of migraines.   How long will I be on this medication for? The amount of time you will be on this medication will be determined by your doctor and your response to the medication.    How do I take this medication? Take as directed on your prescription label. This medication is a self-injection given every 28 days.    What are some possible side effects? Injection site reactions and hypersensitivity reactions, constipation, new or worsening hypertension.   What happens if I miss a dose? If you miss a dose, take it as soon as you remember, and time next dose 28 days from last dose.                  Medication Safety   What are things I should warn my doctor immediately about? Hypersensitivity reactions, development or worsening of hypertension that may occur anytime during treatment. Tell you doctor if you develop new or worsening hypertension    What are things that I should be cautious of? Injection site reaction, constipation, new or worsening hypertension.   What are some medications that can interact with this one? No drug interactions identified.            Medication Storage/Handling   How should I handle this medication? Keep this medication our of reach of pets/children in original container.  On the day your Aimovig is due let it set at room temperature for 30 minutes prior to injection. (do NOT warm using a heat source such as hot water or a microwave).  Administer in the abdomen, thigh, back of the upper arm, or buttocks.  Do not inject where the skin is tender, bruised, red or hard.  Rotate injection sites.   How does this medication need to be stored? Store in refrigerator and keep dry.   How should I dispose of this medication? You can dispose of the empty syringe in a sharps container, and if this is not available you may use an empty hard  plastic container such as a milk jug or tide container.            Resources/Support   How can I remind myself to take this medication? You can download a reminder brett on your phone or use a calandar  to help with your monthly injection.   Is financial support available?  Yes, [employer] and [employer] can provide co-pay cards if you have commercial insurance or patient assistance if you have Medicare or no insurance.    Which vaccines are recommended for me? Talk to your doctor about these vaccines: Flu, Coronavirus (COVID-19), Pneumococcal (pneumonia), Tdap, Hepatitis B, Zoster (shingles)               Adherence, Self-Administration, and Current Therapy Problems  Adherence related to the patient's specialty therapy was discussed with the patient. The Adherence segment of this outreach has been reviewed and updated.     Is there a concern with patient's ability to self administer the medication correctly and without issue?: No  Were any potential barriers to adherence identified during the initial assessment or patient education?: No  Are there any concerns regarding the patient's understanding of the importance of medication adherence?: No    Adherence Questions  Medication(s) assessed: Botox  On average, how many doses/injections does the patient miss per month?: 0  What are the identified reasons for non-adherence or missed doses? : no problems identfied  What is the estimated medication adherence level?: %  Based on the patient/caregiver response and refill history, does this patient require an MTP to track adherence improvements?: no    • Additional Barriers to Patient Self-Administration: none  • Methods for Supporting Patient Self-Administration: Aimovig self-injection teaching during this video assessment.     Medication Therapy Recommendations  No medication therapy recommendations to display    Adverse Drug Reactions  Medication tolerability: Tolerating with no to minimal ADRs  Medication plan: Continue  therapy with normal follow-up  Plan for ADR Management: none    Goals of Therapy  Goals related to the patient's specialty therapy were discussed with the patient. The Patient Goals segment of this outreach has been reviewed and updated.   Goals     • Specialty Pharmacy General Goal      Decrease frequency, severity and duration of migraine attacks by 50%                 Quality of Life Assessment   Quality of Life related to the patient's enrollment in the patient management program and services provided was discussed with the patient. The QOL segment of this outreach has been reviewed and updated.   Quality of Life Improvement Scale: Somewhat better    Reassessment Plan & Follow-Up  1. Medication Therapy Changes: Start Aimovig 140mg SubQ monthly  2. Related Plans, Therapy Recommendations, or Issues to Be Addressed: none  3. Pharmacist to perform regular reassessments no more than (6) months from the previous assessment.  4. Care Coordinator to set up future refill outreaches, coordinate prescription delivery, and escalate clinical questions to pharmacist.     Attestation  I attest that the initiated specialty medication(s) are currently Therapeutic appropriateness: Appropriate for the patient based on my assessment.  If the prescribed therapy is at any point deemed not appropriate based on the current or future assessments, a consultation will be initiated with the patient's specialty care provider to determine the best course of action. The revised plan of therapy will be documented along with any additional patient education provided. Discussed aforementioned material with patient via telemedicine.    Nova Forman PharmD, [facility] Specialty Pharmacist, Neurology  6/15/2023  14:23 EDT

## 2023-08-28 ENCOUNTER — SPECIALTY PHARMACY (OUTPATIENT)
Dept: ONCOLOGY | Facility: HOSPITAL | Age: 43
End: 2023-08-28
Payer: COMMERCIAL

## 2023-08-29 NOTE — PROGRESS NOTES
Specialty Pharmacy Refill Coordination Note     Ivette is a 43 y.o. female contacted today regarding refills of  Botox specialty medication(s).     Reviewed and verified with patient:       Specialty medication(s) and dose(s) confirmed: yes    Refill Questions      Flowsheet Row Most Recent Value   Changes to allergies? No   Changes to medications? No   New conditions since last clinic visit No   Unplanned office visit, urgent care, ED, or hospital admission in the last 4 weeks  No   How does patient/caregiver feel medication is working? Good   Financial problems or insurance changes  No   Since the previous refill, were any specialty medication doses or scheduled injections missed or delayed?  No   Does this patient require a clinical escalation to a pharmacist? No            Delivery Questions      Flowsheet Row Most Recent Value   Delivery method FedEx   Delivery address correct? Yes   Preferred delivery time? Anytime   Number of medications in delivery 1   Medication being filled and delivered Botox   Doses left of specialty medications 0   Is there any medication that is due not being filled? No   Supplies needed? No supplies needed   Cooler needed? Yes   Do any medications need mixed or dated? No   Copay form of payment Payment plan already set up   Additional comments Botox=$0   Questions or concerns for the pharmacist? No   Are any medications first time fills? No              Medication Adherence    Adherence tools used: calendar  Support network for adherence: healthcare provider          Follow-up: 3 month(s)     Ne Caraballo, Pharmacy Technician  Specialty Pharmacy Technician

## 2023-09-10 ENCOUNTER — DOCUMENTATION (OUTPATIENT)
Dept: FAMILY MEDICINE CLINIC | Facility: CLINIC | Age: 43
End: 2023-09-10
Payer: COMMERCIAL

## 2023-09-10 RX ORDER — SULFAMETHOXAZOLE AND TRIMETHOPRIM 800; 160 MG/1; MG/1
1 TABLET ORAL 2 TIMES DAILY
Qty: 10 TABLET | Refills: 0 | Status: SHIPPED | OUTPATIENT
Start: 2023-09-10

## 2023-09-10 RX ORDER — PHENAZOPYRIDINE HYDROCHLORIDE 200 MG/1
200 TABLET, FILM COATED ORAL 3 TIMES DAILY PRN
Qty: 9 TABLET | Refills: 0 | Status: SHIPPED | OUTPATIENT
Start: 2023-09-10

## 2023-09-13 ENCOUNTER — PROCEDURE VISIT (OUTPATIENT)
Dept: NEUROLOGY | Facility: CLINIC | Age: 43
End: 2023-09-13
Payer: COMMERCIAL

## 2023-09-13 DIAGNOSIS — G43.009 MIGRAINE WITHOUT AURA AND WITHOUT STATUS MIGRAINOSUS, NOT INTRACTABLE: Primary | ICD-10-CM

## 2023-09-13 NOTE — PROGRESS NOTES
Botox Procedure Note       Patient Name: Ivette Hughes  : 1980   MRN: [id number]   PCP: Dr Macias  Chief Complaint:    Chief Complaint   Patient presents with    Botulinum Toxin Injection    Patient supplied.  Please do not bill.       History of Present Illness: Ivette Hughes is a 43 y.o. female who is here today for Botox injections for migraines.     Abortives: Imitrex, Maxalt, Nurtec-ineffective, fioricet,  lortabs, toradol, Aleve, IBU, excedrin, Tylenol  Preventives:Emgality x2 years, stopped due ineffective Elavil-ineffective, Topamax-brain fog, propranolol       We have discussed risk and benefits of this Botox procedure and common side effects including headache, bleeding, infection, worsening of pain, neck pain, neck stiffness or weakness, damage to the areas being injected, weakness of muscles, loss of muscle control, ptosis, flu-like symptoms as well as more serious possible adverse effects including possible dysphagia, facial droop if injecting the facial area, painful injection, respiratory distress or even death (death has only been reported once with adults for Botox for migraines in another state when mixed with lidocaine solution which we do not use lidocaine solution in our practice for mixing Botox). The patient verbally understands and accepts risks and agrees with moving forward with Botox injections for chronic migraine prevention.    Verbal and written consent has been obtained from the patient prior to beginning treatment injections.     Pertinent Medical History:  Response to treatment has reduced headaches at least 7 fewer days a month and / or 100 hours fewer each month.       Subjective        The following portions of the patient's history were reviewed an updated as appropriate: past family history, past medical history, past social history, past surgical history.     Medications:     Current Outpatient Medications:     Erenumab-aooe (Aimovig) 140 MG/ML auto-injector,  Inject 1 mL under the skin into the appropriate area as directed Every 28 (Twenty-Eight) Days., Disp: 1 mL, Rfl: 11    estradiol (VIVELLE-DOT) 0.1 MG/24HR patch, , Disp: , Rfl:     fluticasone (FLONASE) 50 MCG/ACT nasal spray, 1-2 sprays into the nostril(s) as directed by provider Daily., Disp: 18.2 mL, Rfl: 11    hydrocortisone (ANUSOL-HC) 25 MG suppository, Insert 1 suppository into the rectum 2 (Two) Times a Day., Disp: 24 each, Rfl: 1    ibuprofen (ADVIL,MOTRIN) 800 MG tablet, 1 tab PO Q8h PRN moderate pain, Disp: 90 tablet, Rfl: 5    methylPREDNISolone (MEDROL) 4 MG dose pack, Take as directed on package instructions., Disp: 1 each, Rfl: 0    nitrofurantoin, macrocrystal-monohydrate, (Macrobid) 100 MG capsule, Take 1 capsule by mouth 2 (Two) Times a Day., Disp: 14 capsule, Rfl: 0    OnabotulinumtoxinA 200 units reconstituted solution, FOR . PHYSICIAN TO INJECT 155 UNITS INTRAMUSCULARLY INTO HEAD, NECK AND SHOULDERS EVERY 12 WEEKS Per FDA PROTOCOL, Disp: 1 each, Rfl: 3    phenazopyridine (Pyridium) 200 MG tablet, Take 1 tablet by mouth 3 (Three) Times a Day As Needed for Bladder Spasms., Disp: 9 tablet, Rfl: 0    promethazine (PHENERGAN) 25 MG suppository, Insert 1 suppository into the rectum Every 6 (Six) Hours As Needed for Nausea or Vomiting., Disp: 6 suppository, Rfl: 0    promethazine (PHENERGAN) 25 MG suppository, Insert 1 suppository into the rectum Every 6 (Six) Hours As Needed for Nausea or Vomiting., Disp: 30 suppository, Rfl: 1    sertraline (ZOLOFT) 100 MG tablet, Take 1.5 tablets by mouth Daily., Disp: 135 tablet, Rfl: 3    sulfamethoxazole-trimethoprim (Bactrim DS) 800-160 MG per tablet, Take 1 tablet by mouth 2 (Two) Times a Day., Disp: 10 tablet, Rfl: 0    traZODone (DESYREL) 100 MG tablet, TAKE ONE TABLET BY MOUTH TWICE A DAY AFTER MEALS, Disp: 180 tablet, Rfl: 1    Current Facility-Administered Medications:     OnabotulinumtoxinA 200 Units, 200 Units, Intramuscular, Q3  Kathie Duke Juawanna L, DNP, APRN, 200 Units at 05/04/23 0756    Allergies:   No Known Allergies    Objective     Physical Exam:  Vital Signs: There were no vitals filed for this visit.  There is no height or weight on file to calculate BMI.     Physical Exam  AxOX3      BOTOX PROCEDURE:     The patient was placed in a comfortable area and the sites to be treated were identified. A time out was called and performed. The area to be treated was prepped three times with alcohol and the alcohol allowed to dry. Next, a 30 gauge needle was used to inject the medication in the area to be treated.     Date of Last Injection: 5/3/23 w me  Response: good  Onset of response:  1 week   Wearing off: none  Side Effects: none  : Iterable  Lot #: [id number]  Expiration Date: 02/2026  NDC: 87788vq51     Botox is pt supplied    Botox was injected using FDA approved protocol for chronic migraine prevention.   200 unit vial Botox was re-constituted with 4 mL 0.9 NS to 5 unit/0.1 mL using standard techniques.  10 units were given at the  muscle in 2 divided sites  5 units were given at the Procerus muscle  20 units were given at the Frontalis muscle in 4 divided sites  40 units were given at the Temporalis muscle in 8 divided sites  30 units were given at the Occipitalis muscle in 6 divided sites  20 units were given at the Cervical paraspinal muscle in 4 divided sites  30 units were given at the Trapezius muscle in 6 divided sites  10 units were givien at the Masseter muscle in 2 divided sites    The total amount injected in units is 165  The total amount wasted in units is 35  The total amount submitted in units is 200.   Botox was administered by Nurse practitioner.     Patient tolerated procedure well with no immediate complications.     Assessment / Plan      Assessment/Plan:   Diagnoses and all orders for this visit:    1. Migraine without aura and without status migrainosus, not intractable  (Primary)  Comments:  Cont botox         Patient Education:       Reviewed medications, potential side effects and signs and symptoms to report. Discussed risk versus benefits of treatment plan with patient and/or family-including medications, labs and radiology that may be ordered. Addressed questions and concerns during visit. Patient and/or family verbalized understanding and agree with plan. Instructed to call the office with any questions and report to ER with any life-threatening symptoms.     Follow Up:   It has been determined that  has chronic migraine headaches. We will proceed with a 12 week regimen of 200 units of Botox injections, to treat the patient's chronic migraines.   3 months    During this visit the following were done:  Labs Reviewed []    Labs Ordered []    Radiology Reports Reviewed [x]    Radiology Ordered []    PCP Records Reviewed []    Referring Provider Records Reviewed []    ER Records Reviewed []    Hospital Records Reviewed []    History Obtained From Family []    Radiology Images Reviewed []    Other Reviewed []    Records Requested []        Kyra Vázquez, DNP, APRN

## 2023-09-25 ENCOUNTER — SPECIALTY PHARMACY (OUTPATIENT)
Dept: ONCOLOGY | Facility: HOSPITAL | Age: 43
End: 2023-09-25

## 2023-09-25 NOTE — PROGRESS NOTES
Specialty Pharmacy Refill Coordination Note     Ivette is a 43 y.o. female contacted today regarding refills of Aimovig specialty medication(s).. patient Injection is due on 9/28    Reviewed and verified with patient:       Specialty medication(s) and dose(s) confirmed: yes    Refill Questions      Flowsheet Row Most Recent Value   Changes to allergies? No   Changes to medications? No   New conditions since last clinic visit No   Unplanned office visit, urgent care, ED, or hospital admission in the last 4 weeks  No   How does patient/caregiver feel medication is working? Very good   Financial problems or insurance changes  No   Since the previous refill, were any specialty medication doses or scheduled injections missed or delayed?  No   Does this patient require a clinical escalation to a pharmacist? No            Delivery Questions      Flowsheet Row Most Recent Value   Delivery method FedEx   Delivery address correct? Yes   Delivery phone number mobile phone   Preferred delivery time? Anytime   Number of medications in delivery 1   Medication being filled and delivered Aimovig =$0   Doses left of specialty medications N/A   Is there any medication that is due not being filled? No   Supplies needed? No supplies needed   Cooler needed? Yes   Do any medications need mixed or dated? No   Copay form of payment Payment plan already set up   Additional comments N/A   Questions or concerns for the pharmacist? No   Explain any questions or concerns for the pharmacist N/A   Are any medications first time fills? No   Tracking number for delivery N/A              Medication Adherence    Adherence tools used: calendar  Support network for adherence: healthcare provider          Follow-up: 30 day(s)     Viraj Masterson  Specialty Pharmacy Technician

## 2023-10-17 DIAGNOSIS — Z87.442 HISTORY OF NEPHROLITHIASIS: ICD-10-CM

## 2023-10-17 DIAGNOSIS — R10.9 FLANK PAIN: Primary | ICD-10-CM

## 2023-10-18 DIAGNOSIS — R10.30 SEVERE GROIN PAIN: Primary | ICD-10-CM

## 2023-10-18 RX ORDER — TRIAMCINOLONE ACETONIDE 40 MG/ML
80 INJECTION, SUSPENSION INTRA-ARTICULAR; INTRAMUSCULAR ONCE
Status: COMPLETED | OUTPATIENT
Start: 2023-10-18 | End: 2023-10-18

## 2023-10-18 RX ORDER — KETOROLAC TROMETHAMINE 30 MG/ML
60 INJECTION, SOLUTION INTRAMUSCULAR; INTRAVENOUS ONCE
Status: COMPLETED | OUTPATIENT
Start: 2023-10-18 | End: 2023-10-18

## 2023-10-18 RX ORDER — METHOCARBAMOL 750 MG/1
750 TABLET, FILM COATED ORAL 3 TIMES DAILY PRN
Qty: 30 TABLET | Refills: 2 | Status: SHIPPED | OUTPATIENT
Start: 2023-10-18

## 2023-10-18 RX ADMIN — TRIAMCINOLONE ACETONIDE 80 MG: 40 INJECTION, SUSPENSION INTRA-ARTICULAR; INTRAMUSCULAR at 16:24

## 2023-10-18 RX ADMIN — KETOROLAC TROMETHAMINE 60 MG: 30 INJECTION, SOLUTION INTRAMUSCULAR; INTRAVENOUS at 16:23

## 2023-10-23 ENCOUNTER — SPECIALTY PHARMACY (OUTPATIENT)
Dept: ONCOLOGY | Facility: HOSPITAL | Age: 43
End: 2023-10-23
Payer: COMMERCIAL

## 2023-10-23 NOTE — PROGRESS NOTES
Specialty Pharmacy Refill Coordination Note     Ivette is a 43 y.o. female contacted today regarding refills of  Aimovig specialty medication(s). Patient is due for injection on 10/26.      Reviewed and verified with patient:       Specialty medication(s) and dose(s) confirmed: yes    Refill Questions      Flowsheet Row Most Recent Value   Changes to allergies? No   Changes to medications? No   New conditions since last clinic visit No   Unplanned office visit, urgent care, ED, or hospital admission in the last 4 weeks  No   How does patient/caregiver feel medication is working? Very good   Financial problems or insurance changes  No   Since the previous refill, were any specialty medication doses or scheduled injections missed or delayed?  No   Does this patient require a clinical escalation to a pharmacist? No            Delivery Questions      Flowsheet Row Most Recent Value   Delivery method FedEx   Delivery address correct? Yes   Preferred delivery time? Anytime   Number of medications in delivery 1   Medication(s) being filled and delivered Erenumab-Aooe   Doses left of specialty medications Aimovig=0   Is there any medication that is due not being filled? No   Supplies needed? No supplies needed   Cooler needed? Yes   Do any medications need mixed or dated? No   Copay form of payment Payment plan already set up   Additional comments Aimovig=$0   Questions or concerns for the pharmacist? No   Are any medications first time fills? No              Medication Adherence          Adherence tools used: calendar      Support network for adherence: healthcare provider                Follow-up: 28 day(s)     Ne Caraballo, Pharmacy Technician  Specialty Pharmacy Technician

## 2023-10-25 ENCOUNTER — TELEPHONE (OUTPATIENT)
Dept: FAMILY MEDICINE CLINIC | Facility: CLINIC | Age: 43
End: 2023-10-25

## 2023-10-25 NOTE — TELEPHONE ENCOUNTER
Caller: Ivette Hughes    Relationship: Self    Best call back number: [phone]    What specialty or service is being requested:     CT SCAN    Any additional details:     PLEASE CALL PATIENT WITH APPOINTMENT DETAILS

## 2023-10-26 DIAGNOSIS — R10.30 SEVERE GROIN PAIN: Primary | ICD-10-CM

## 2023-10-26 RX ORDER — TRAZODONE HYDROCHLORIDE 100 MG/1
TABLET ORAL
Qty: 180 TABLET | Refills: 1 | Status: SHIPPED | OUTPATIENT
Start: 2023-10-26

## 2023-10-30 ENCOUNTER — TELEPHONE (OUTPATIENT)
Dept: FAMILY MEDICINE CLINIC | Facility: CLINIC | Age: 43
End: 2023-10-30

## 2023-10-30 DIAGNOSIS — R10.30 SEVERE GROIN PAIN: Primary | ICD-10-CM

## 2023-10-30 DIAGNOSIS — R10.9 FLANK PAIN: ICD-10-CM

## 2023-10-30 NOTE — TELEPHONE ENCOUNTER
Caller: Ivette Hughes    Relationship: Self    Best call back number: [phone]    What is the medical concern/diagnosis:PT CALLED TO CHECK STATUS FOR CT SCAN, STATED THAT SHE HAS NOT HEARD BACK FROM ANYONE.

## 2023-11-15 ENCOUNTER — SPECIALTY PHARMACY (OUTPATIENT)
Dept: ONCOLOGY | Facility: HOSPITAL | Age: 43
End: 2023-11-15
Payer: COMMERCIAL

## 2023-11-15 NOTE — PROGRESS NOTES
Specialty Pharmacy Refill Coordination Note     Ivette is a 43 y.o. female contacted today regarding refills of  Aimovig specialty medication(s).. patient Injection is due on 11/26    Reviewed and verified with patient:       Specialty medication(s) and dose(s) confirmed: yes    Refill Questions      Flowsheet Row Most Recent Value   Changes to allergies? No   Changes to medications? No   New conditions since last clinic visit No   Unplanned office visit, urgent care, ED, or hospital admission in the last 4 weeks  No   How does patient/caregiver feel medication is working? Very good   Financial problems or insurance changes  No   Since the previous refill, were any specialty medication doses or scheduled injections missed or delayed?  No   Does this patient require a clinical escalation to a pharmacist? No            Delivery Questions      Flowsheet Row Most Recent Value   Delivery method FedEx   Delivery address correct? Yes   Delivery phone number mobile phone   Preferred delivery time? Anytime   Number of medications in delivery 1   Medication(s) being filled and delivered Erenumab-Aooe   Doses left of specialty medications N/A   Is there any medication that is due not being filled? No   Supplies needed? No supplies needed   Cooler needed? Yes   Do any medications need mixed or dated? No   Copay form of payment Payment plan already set up   Additional comments N/A   Questions or concerns for the pharmacist? No   Explain any questions or concerns for the pharmacist N/A   Are any medications first time fills? No   Tracking number for delivery N/A              Medication Adherence          Adherence tools used: calendar      Support network for adherence: healthcare provider                Follow-up: 30 day(s)     Viraj Masterson  Specialty Pharmacy Technician

## 2023-11-16 NOTE — PROGRESS NOTES
Specialty Pharmacy Patient Management Program  Neurology Reassessment     Ivette Hughes is a 43 y.o. female with migraines seen by a Neurology provider and enrolled in the Neurology Patient Management program offered by [facility].  A follow-up outreach was conducted, including assessment of continued therapy appropriateness, medication adherence, and side effect incidence and management for Botox and Aimovig.     Changes to Insurance Coverage or Financial Support  [employer], AImovig and Botox co-pay cards.     Relevant Past Medical History and Comorbidities  Relevant medical history and concomitant health conditions were discussed with the patient. The patient's chart has been reviewed for relevant past medical history and comorbid health conditions and updated as necessary.   Past Medical History:   Diagnosis Date    Anxiety     Cluster headache     Gall stones     Insomnia     Kidney stones     Migraine headache     Panic attack     Tension headache      Social History     Socioeconomic History    Marital status:    Tobacco Use    Smoking status: Never    Smokeless tobacco: Never   Vaping Use    Vaping Use: Never used   Substance and Sexual Activity    Alcohol use: Never    Drug use: Never    Sexual activity: Defer     Problem list reviewed by Nova Forman, Bryon on 11/16/2023 at  7:36 AM    Hospitalizations and Urgent Care Since Last Assessment  ED Visits, Admissions, or Hospitalizations: none   Urgent Office Visits: none     Allergies  Known allergies and reactions were discussed with the patient. The patient's chart has been reviewed for allergy information and updated as necessary.   No Known Allergies  Allergies reviewed by Nova Forman, Bryon on 11/16/2023 at  7:36 AM    Relevant Laboratory Values      Lab Assessment  N/A    Current Medication List  This medication list has been reviewed with the patient and evaluated for any interactions or necessary  modifications/recommendations, and updated to include all prescription medications, OTC medications, and supplements the patient is currently taking.  This list reflects what is contained in the patient's profile, which has also been marked as reviewed to communicate to other providers it is the most up to date version of the patient's current medication therapy.     Current Outpatient Medications:     Erenumab-aooe (Aimovig) 140 MG/ML auto-injector, Inject 1 mL under the skin into the appropriate area as directed Every 28 (Twenty-Eight) Days., Disp: 1 mL, Rfl: 11    estradiol (VIVELLE-DOT) 0.1 MG/24HR patch, , Disp: , Rfl:     fluticasone (FLONASE) 50 MCG/ACT nasal spray, 1-2 sprays into the nostril(s) as directed by provider Daily., Disp: 18.2 mL, Rfl: 11    hydrocortisone (ANUSOL-HC) 25 MG suppository, Insert 1 suppository into the rectum 2 (Two) Times a Day., Disp: 24 each, Rfl: 1    ibuprofen (ADVIL,MOTRIN) 800 MG tablet, 1 tab PO Q8h PRN moderate pain, Disp: 90 tablet, Rfl: 5    methocarbamol (ROBAXIN) 750 MG tablet, Take 1 tablet by mouth 3 (Three) Times a Day As Needed for Muscle Spasms., Disp: 30 tablet, Rfl: 2    methylPREDNISolone (MEDROL) 4 MG dose pack, Take as directed on package instructions., Disp: 1 each, Rfl: 0    nitrofurantoin, macrocrystal-monohydrate, (Macrobid) 100 MG capsule, Take 1 capsule by mouth 2 (Two) Times a Day., Disp: 14 capsule, Rfl: 0    OnabotulinumtoxinA 200 units reconstituted solution, FOR . PHYSICIAN TO INJECT 155 UNITS INTRAMUSCULARLY INTO HEAD, NECK AND SHOULDERS EVERY 12 WEEKS Per FDA PROTOCOL, Disp: 1 each, Rfl: 3    phenazopyridine (Pyridium) 200 MG tablet, Take 1 tablet by mouth 3 (Three) Times a Day As Needed for Bladder Spasms., Disp: 9 tablet, Rfl: 0    promethazine (PHENERGAN) 25 MG suppository, Insert 1 suppository into the rectum Every 6 (Six) Hours As Needed for Nausea or Vomiting., Disp: 6 suppository, Rfl: 0    promethazine (PHENERGAN) 25 MG  suppository, Insert 1 suppository into the rectum Every 6 (Six) Hours As Needed for Nausea or Vomiting., Disp: 30 suppository, Rfl: 1    sertraline (ZOLOFT) 100 MG tablet, Take 1.5 tablets by mouth Daily., Disp: 135 tablet, Rfl: 3    sulfamethoxazole-trimethoprim (Bactrim DS) 800-160 MG per tablet, Take 1 tablet by mouth 2 (Two) Times a Day., Disp: 10 tablet, Rfl: 0    traZODone (DESYREL) 100 MG tablet, 1-2 tabs PO QHS, Disp: 180 tablet, Rfl: 1    Current Facility-Administered Medications:     OnabotulinumtoxinA 200 Units, 200 Units, Intramuscular, Q3 Months, Kyra Vázquez, DNP, APRN, 200 Units at 09/13/23 0953    Medicines reviewed by Nova Forman, PharmD on 11/16/2023 at  7:36 AM  Medicines reviewed by Nova Forman, PharmD on 11/16/2023 at  7:36 AM    Drug Interactions  none     Adverse Drug Reactions  Medication tolerability: Tolerating with no to minimal ADRs          Medication plan: Continue therapy with normal follow-up  Plan for ADR Management: not required      Adherence, Self-Administration, and Current Therapy Problems  Adherence related patient's specialty therapy was discussed with the patient. The Adherence segment of this outreach has been reviewed and updated.   Adherence Questions  Linked Medication(s) Assessed: Erenumab-Aooe, Onabotulinumtoxina  On average, how many doses/injections does the patient miss per month?: 0  What are the identified reasons for non-adherence or missed doses? : no problems identified  What is the estimated medication adherence level?: % (pt started Aimovig treatment 8/23)  Based on the patient/caregiver response and refill history, does this patient require an MTP to track adherence improvements?: no    Additional Barriers to Patient Self-Administration: none  Methods for Supporting Patient Self-Administration: pt adept with Aimovig self-injections.    Recently Close Medication Therapy Problems  No medication therapy recommendations to display  Open  Medication Therapy Problems  No medication therapy recommendations to display     Goals of Therapy  Goals related to the patient's specialty therapy was discussed with the patient. The Patient Goals segment of this outreach has been reviewed and updated.    Goals Addressed Today        Specialty Pharmacy General Goal      Decrease frequency, severity and duration of migraine attacks                   Quality of Life Assessment   Quality of Life related to the patient's enrollment in the patient management program and services provided was discussed with the patient. The QOL segment of this outreach has been reviewed and updated.   Quality of Life Improvement Scale: Moderately better    Reassessment Plan & Follow-Up  Medication Therapy Changes: Continue Aimovig 140 mg SubQ monthly and Botox 155 units per provider every 3 months.  Related Plans, Therapy Recommendations, or Issues to Be Addressed: none  Pharmacist to perform regular reassessments no more than (6) months from the previous assessment.  Care Coordinator to set up future refill outreaches, coordinate prescription delivery, and escalate clinical questions to pharmacist.     Attestation  Therapeutic appropriateness: Appropriate  I attest the patient was actively involved in and has agreed to the above plan of care. If the prescribed therapy is at any point deemed not appropriate based on the current or future assessments, a consultation will be initiated with the patient's specialty care provider to determine the best course of action. The revised plan of therapy will be documented along with any additional patient education provided. Discussed aforementioned material with patient via telemedicine.    Nova Forman, PharmD, [facility] Specialty Pharmacist, Neurology  11/16/2023  07:51 EST

## 2023-11-21 ENCOUNTER — DOCUMENTATION (OUTPATIENT)
Dept: ONCOLOGY | Facility: HOSPITAL | Age: 43
End: 2023-11-21
Payer: COMMERCIAL

## 2023-11-22 ENCOUNTER — SPECIALTY PHARMACY (OUTPATIENT)
Dept: ONCOLOGY | Facility: HOSPITAL | Age: 43
End: 2023-11-22
Payer: COMMERCIAL

## 2023-11-22 NOTE — PROGRESS NOTES
Specialty Pharmacy Refill Coordination Note     Ivette is a 43 y.o. female contacted today regarding refills of  Botox specialty medication(s). Patient is scheduled for her botox appointment on 12/6.    Reviewed and verified with patient:       Specialty medication(s) and dose(s) confirmed: yes    Refill Questions      Flowsheet Row Most Recent Value   Changes to allergies? No   Changes to medications? No   New conditions since last clinic visit No   Unplanned office visit, urgent care, ED, or hospital admission in the last 4 weeks  No   How does patient/caregiver feel medication is working? Very good   Financial problems or insurance changes  No   Since the previous refill, were any specialty medication doses or scheduled injections missed or delayed?  No   Does this patient require a clinical escalation to a pharmacist? No            Delivery Questions      Flowsheet Row Most Recent Value   Delivery method FedEx   Delivery address correct? Yes   Preferred delivery time? Anytime   Number of medications in delivery 1   Medication(s) being filled and delivered Onabotulinumtoxina   Doses left of specialty medications Botox=0   Is there any medication that is due not being filled? No   Supplies needed? No supplies needed   Cooler needed? Yes   Do any medications need mixed or dated? No   Copay form of payment Payment plan already set up   Additional comments Botox=$0   Questions or concerns for the pharmacist? No   Are any medications first time fills? No              Medication Adherence          Adherence tools used: calendar      Support network for adherence: healthcare provider                Follow-up: 3 month(s)     Ne Caraballo, Pharmacy Technician  Specialty Pharmacy Technician

## 2023-12-06 ENCOUNTER — PROCEDURE VISIT (OUTPATIENT)
Dept: NEUROLOGY | Facility: CLINIC | Age: 43
End: 2023-12-06
Payer: COMMERCIAL

## 2023-12-06 DIAGNOSIS — G43.009 MIGRAINE WITHOUT AURA AND WITHOUT STATUS MIGRAINOSUS, NOT INTRACTABLE: Primary | ICD-10-CM

## 2023-12-06 NOTE — PROGRESS NOTES
Botox Procedure Note       Patient Name: Ivette Hughes  : 1980   MRN: [id number]     Chief Complaint:    Chief Complaint   Patient presents with    Botulinum Toxin Injection     Patient Supplied-Do Not Bill (200 UNITS)       History of Present Illness: Ivette Hughes is a 43 y.o. female who is here today for Botox injections for migraines.     We have discussed risk and benefits of this Botox procedure and common side effects including headache, bleeding, infection, worsening of pain, neck pain, neck stiffness or weakness, damage to the areas being injected, weakness of muscles, loss of muscle control, ptosis, flu-like symptoms as well as more serious possible adverse effects including possible dysphagia, facial droop if injecting the facial area, painful injection, respiratory distress or even death (death has only been reported once with adults for Botox for migraines in another state when mixed with lidocaine solution which we do not use lidocaine solution in our practice for mixing Botox). The patient verbally understands and accepts risks and agrees with moving forward with Botox injections for chronic migraine prevention.    Verbal and written consent has been obtained from the patient prior to beginning treatment injections.     Pertinent Medical History:  Response to treatment has reduced headaches at least 7 fewer days a month and / or 100 hours fewer each month.       Subjective          The following portions of the patient's history were reviewed an updated as appropriate: past family history, past medical history, past social history, past surgical history.     Medications:     Current Outpatient Medications:     Erenumab-aooe (Aimovig) 140 MG/ML auto-injector, Inject 1 mL under the skin into the appropriate area as directed Every 28 (Twenty-Eight) Days., Disp: 1 mL, Rfl: 11    estradiol (VIVELLE-DOT) 0.1 MG/24HR patch, , Disp: , Rfl:     fluticasone (FLONASE) 50 MCG/ACT nasal spray, 1-2 sprays  into the nostril(s) as directed by provider Daily., Disp: 18.2 mL, Rfl: 11    hydrocortisone (ANUSOL-HC) 25 MG suppository, Insert 1 suppository into the rectum 2 (Two) Times a Day., Disp: 24 each, Rfl: 1    ibuprofen (ADVIL,MOTRIN) 800 MG tablet, 1 tab PO Q8h PRN moderate pain, Disp: 90 tablet, Rfl: 5    methocarbamol (ROBAXIN) 750 MG tablet, Take 1 tablet by mouth 3 (Three) Times a Day As Needed for Muscle Spasms., Disp: 30 tablet, Rfl: 2    methylPREDNISolone (MEDROL) 4 MG dose pack, Take as directed on package instructions., Disp: 1 each, Rfl: 0    nitrofurantoin, macrocrystal-monohydrate, (Macrobid) 100 MG capsule, Take 1 capsule by mouth 2 (Two) Times a Day., Disp: 14 capsule, Rfl: 0    OnabotulinumtoxinA 200 units reconstituted solution, FOR . PHYSICIAN TO INJECT 155 UNITS INTRAMUSCULARLY INTO HEAD, NECK AND SHOULDERS EVERY 12 WEEKS Per FDA PROTOCOL, Disp: 1 each, Rfl: 3    phenazopyridine (Pyridium) 200 MG tablet, Take 1 tablet by mouth 3 (Three) Times a Day As Needed for Bladder Spasms., Disp: 9 tablet, Rfl: 0    promethazine (PHENERGAN) 25 MG suppository, Insert 1 suppository into the rectum Every 6 (Six) Hours As Needed for Nausea or Vomiting., Disp: 6 suppository, Rfl: 0    promethazine (PHENERGAN) 25 MG suppository, Insert 1 suppository into the rectum Every 6 (Six) Hours As Needed for Nausea or Vomiting., Disp: 30 suppository, Rfl: 1    sertraline (ZOLOFT) 100 MG tablet, Take 1.5 tablets by mouth Daily., Disp: 135 tablet, Rfl: 3    sulfamethoxazole-trimethoprim (Bactrim DS) 800-160 MG per tablet, Take 1 tablet by mouth 2 (Two) Times a Day., Disp: 10 tablet, Rfl: 0    traZODone (DESYREL) 100 MG tablet, 1-2 tabs PO QHS, Disp: 180 tablet, Rfl: 1    Current Facility-Administered Medications:     OnabotulinumtoxinA 200 Units, 200 Units, Intramuscular, Q3 Months, Kyra Vázquez, DNP, APRN, 200 Units at 09/13/23 0951    Allergies:   No Known Allergies    Objective     Physical  Exam:  Vital Signs: There were no vitals filed for this visit.  There is no height or weight on file to calculate BMI.     Physical Exam  Alert and oriented x 3  BOTOX PROCEDURE:     The patient was placed in a comfortable area and the sites to be treated were identified. A time out was called and performed. The area to be treated was prepped three times with alcohol and the alcohol allowed to dry. Next, a 30 gauge needle was used to inject the medication in the area to be treated.     Date of Last Injection: 9/13/23  Response: good  Onset of response: one week   Wearing off: none  Side Effects: none  : Allergan  Lot #: [id number]  Expiration Date: 04/2026  NDC: 19902oq20   Botox is pt supplied    Botox was injected using FDA approved protocol for chronic migraine prevention.   200 unit vial Botox was re-constituted with 4 mL 0.9 NS to 5 unit/0.1 mL using standard techniques.  10 units were given at the  muscle in 2 divided sites  5 units were given at the Procerus muscle  20 units were given at the Frontalis muscle in 4 divided sites  40 units were given at the Temporalis muscle in 8 divided sites  30 units were given at the Occipitalis muscle in 6 divided sites  20 units were given at the Cervical paraspinal muscle in 4 divided sites  30 units were given at the Trapezius muscle in 6 divided sites  5 units were given at the Masseter muscle in 2 divided doses.    The total amount injected in units is 160  The total amount wasted in units is 40  The total amount submitted in units is 200.   Botox was administered by Nurse practitioner.     Patient tolerated procedure well with no immediate complications.     Assessment / Plan      Assessment/Plan:   Diagnoses and all orders for this visit:    1. Migraine without aura and without status migrainosus, not intractable (Primary)  Comments:  Cont botox         Patient Education:       Reviewed medications, potential side effects and signs and symptoms  to report. Discussed risk versus benefits of treatment plan with patient and/or family-including medications, labs and radiology that may be ordered. Addressed questions and concerns during visit. Patient and/or family verbalized understanding and agree with plan. Instructed to call the office with any questions and report to ER with any life-threatening symptoms.     Follow Up:   It has been determined that this patient has chronic migraine headaches. We will proceed with a 12 week regimen of 200 units of Botox injections, to treat the patient's chronic migraines.  Return in about 3 months (around 3/6/2024) for Botox.    During this visit the following were done:  Labs Reviewed []    Labs Ordered []    Radiology Reports Reviewed []    Radiology Ordered []    PCP Records Reviewed []    Referring Provider Records Reviewed []    ER Records Reviewed []    Hospital Records Reviewed []    History Obtained From Family []    Radiology Images Reviewed []    Other Reviewed []    Records Requested []        Kyra Vázquez, DNP, APRN

## 2023-12-14 ENCOUNTER — SPECIALTY PHARMACY (OUTPATIENT)
Dept: PHARMACY | Facility: HOSPITAL | Age: 43
End: 2023-12-14
Payer: COMMERCIAL

## 2023-12-14 RX ORDER — PHENAZOPYRIDINE HYDROCHLORIDE 200 MG/1
200 TABLET, FILM COATED ORAL 3 TIMES DAILY PRN
Qty: 9 TABLET | Refills: 0 | Status: SHIPPED | OUTPATIENT
Start: 2023-12-14

## 2023-12-14 RX ORDER — FLUCONAZOLE 150 MG/1
TABLET ORAL
Qty: 2 TABLET | Refills: 1 | Status: SHIPPED | OUTPATIENT
Start: 2023-12-14

## 2023-12-14 RX ORDER — CIPROFLOXACIN 500 MG/1
500 TABLET, FILM COATED ORAL 2 TIMES DAILY
Qty: 10 TABLET | Refills: 0 | Status: SHIPPED | OUTPATIENT
Start: 2023-12-14

## 2024-01-08 RX ORDER — ESTRADIOL 0.1 MG/D
1 FILM, EXTENDED RELEASE TRANSDERMAL 2 TIMES WEEKLY
Qty: 24 EACH | Refills: 3 | Status: SHIPPED | OUTPATIENT
Start: 2024-01-08

## 2024-01-16 ENCOUNTER — SPECIALTY PHARMACY (OUTPATIENT)
Dept: ONCOLOGY | Facility: HOSPITAL | Age: 44
End: 2024-01-16
Payer: COMMERCIAL

## 2024-01-17 NOTE — PROGRESS NOTES
Specialty Pharmacy Patient Management Program  Neurology Medication Addition Assessment     Ivette Hughes is a 43 y.o. female with migraines seen by a Neurology provider and enrolled in the Neurology Patient Management program offered by [facility].  This assessment was conducted as a result of a specialty medication addition or substitution. The patient was previously introduced to services offered by [facility], including: regular assessments, refill coordination, curbside pick-up or mail order delivery options, prior authorization maintenance, and financial assistance programs as applicable. The patient was also provided with contact information for the pharmacy team.     An initial outreach was conducted, including assessment of therapy appropriateness and specialty medication education for Emgality.  Formulary change from Aimovig to Emgality.     A follow-up outreach was conducted, including assessment of continued therapy appropriateness, medication adherence, and side effect incidence and management for Botox.    Insurance Coverage & Financial Support  [employer], Emgality and Botox co-pay cards.    Relevant Past Medical History and Comorbidities  Relevant medical history and concomitant health conditions were discussed with the patient. The patient's chart has been reviewed for relevant past medical history and comorbid health conditions and updated as necessary.   Past Medical History:   Diagnosis Date    Anxiety     Cluster headache     Gall stones     Insomnia     Kidney stones     Migraine headache     Panic attack     Tension headache      Social History     Socioeconomic History    Marital status:    Tobacco Use    Smoking status: Never    Smokeless tobacco: Never   Vaping Use    Vaping Use: Never used   Substance and Sexual Activity    Alcohol use: Never    Drug use: Never    Sexual activity: Defer     Problem list reviewed by Nova Forman,  B[place]on on 1/17/2024 at  3:31 PM    Hospitalizations and Urgent Care Since Last Assessment  ED Visits, Admissions, or Hospitalizations: none   Urgent Office Visits: none     Allergies  Known allergies and reactions were discussed with the patient. The patient's chart has been reviewed for  allergy information and updated as necessary.   No Known Allergies  Allergies reviewed by Nova Forman PharmD on 1/17/2024 at  3:30 PM    Relevant Laboratory Values      Lab Assessment  N/A.     Current Medication List  This medication list has been reviewed with the patient and evaluated for any interactions or necessary modifications/recommendations, and updated to include all prescription medications, OTC medications, and supplements the patient is currently taking.  This list reflects what is contained in the patient's profile, which has also been marked as reviewed to communicate to other providers it is the most up to date version of the patient's current medication therapy.     Current Outpatient Medications:     ciprofloxacin (Cipro) 500 MG tablet, Take 1 tablet by mouth 2 (Two) Times a Day., Disp: 10 tablet, Rfl: 0    estradiol (VIVELLE-DOT) 0.1 MG/24HR patch, Place 1 patch on the skin as directed by provider 2 (Two) Times a Week., Disp: 24 each, Rfl: 3    fluconazole (Diflucan) 150 MG tablet, Take 1 tab PO on day 1 then 1 tab PO on day 3, Disp: 2 tablet, Rfl: 1    fluticasone (FLONASE) 50 MCG/ACT nasal spray, 1-2 sprays into the nostril(s) as directed by provider Daily., Disp: 18.2 mL, Rfl: 11    galcanezumab-gnlm (EMGALITY) 120 MG/ML auto-injector pen, Inject 1 mL under the skin into the appropriate area as directed Every 28 (Twenty-Eight) Days., Disp: 1 mL, Rfl: 11    galcanezumab-gnlm (EMGALITY) 120 MG/ML auto-injector pen, Inject 2 mL under the skin into the appropriate area as directed 1 (One) Time for 1 dose., Disp: 2 mL, Rfl: 0    hydrocortisone (ANUSOL-HC) 25 MG suppository, Insert 1 suppository into the  rectum 2 (Two) Times a Day., Disp: 24 each, Rfl: 1    ibuprofen (ADVIL,MOTRIN) 800 MG tablet, 1 tab PO Q8h PRN moderate pain, Disp: 90 tablet, Rfl: 5    methocarbamol (ROBAXIN) 750 MG tablet, Take 1 tablet by mouth 3 (Three) Times a Day As Needed for Muscle Spasms., Disp: 30 tablet, Rfl: 2    nitrofurantoin, macrocrystal-monohydrate, (Macrobid) 100 MG capsule, Take 1 capsule by mouth 2 (Two) Times a Day., Disp: 14 capsule, Rfl: 0    OnabotulinumtoxinA 200 units reconstituted solution, FOR . PHYSICIAN TO INJECT 155 UNITS INTRAMUSCULARLY INTO HEAD, NECK AND SHOULDERS EVERY 12 WEEKS Per FDA PROTOCOL, Disp: 1 each, Rfl: 3    phenazopyridine (Pyridium) 200 MG tablet, Take 1 tablet by mouth 3 (Three) Times a Day As Needed for Bladder Spasms., Disp: 9 tablet, Rfl: 0    promethazine (PHENERGAN) 25 MG suppository, Insert 1 suppository into the rectum Every 6 (Six) Hours As Needed for Nausea or Vomiting., Disp: 6 suppository, Rfl: 0    promethazine (PHENERGAN) 25 MG suppository, Insert 1 suppository into the rectum Every 6 (Six) Hours As Needed for Nausea or Vomiting., Disp: 30 suppository, Rfl: 1    sertraline (ZOLOFT) 100 MG tablet, Take 1.5 tablets by mouth Daily., Disp: 135 tablet, Rfl: 3    sulfamethoxazole-trimethoprim (Bactrim DS) 800-160 MG per tablet, Take 1 tablet by mouth 2 (Two) Times a Day., Disp: 10 tablet, Rfl: 0    traZODone (DESYREL) 100 MG tablet, 1-2 tabs PO QHS, Disp: 180 tablet, Rfl: 1    Current Facility-Administered Medications:     OnabotulinumtoxinA 200 Units, 200 Units, Intramuscular, Q3 Months, Kyra Vázquez, DNP, APRN, 200 Units at 12/06/23 1552    Medicines reviewed by Nova Forman, PharmD on 1/17/2024 at  3:30 PM    Drug Interactions  No relevant drug/drug interactions noted.     Initial Education Provided for Specialty Medication  The patient has been provided with the following education and any applicable administration techniques (i.e. self-injection) have been  demonstrated for the therapies indicated. All questions and concerns have been addressed prior to the patient receiving the medication, and the patient has verbalized comprehension of the education and any materials provided.  Additional patient education shall be provided and documented upon request by the patient, provider or payer.              Emgality (Galcanezumab-gnlm)        Medication Expectations   Why am I taking this medication? You are taking this medication for migraine prophylaxis.   What should I expect while on this medication? You should expect to a decrease in the frequency and severity of your migraines.   How does the medication work? Emgality is a monoclonal antibody that binds to calcitonin gene-related peptide (CGRP) and blocks its binding to the receptor decreasing the severity of migraines.   How long will I be on this medication for? The amount of time you will be on this medication will be determined by your doctor and your response to the medication.    How do I take this medication? Take as directed on your prescription label. This medication is a self-injection given every 28 days.    What are some possible side effects? Injection site reactions and hypersensitivity reactions.   What happens if I miss a dose? If you miss a dose, take it as soon as you remember, and time next dose 28 days from last dose.                  Medication Safety   What are things I should warn my doctor immediately about? Hypersensitivity reactions.   What are things that I should be cautious of? Injection site reaction.   What are some medications that can interact with this one? No drug interactions identified.            Medication Storage/Handling   How should I handle this medication? Keep this medication our of reach of pets/children in original container.  On the day your Emgality is due let it set at room temperature for 30 minutes prior to injection. (do NOT warm using a heat source such as hot water  or a microwave).  Administer in the abdomen, thigh, back of the upper arm, or buttocks.  Do not inject where the skin is tender, bruised, red or hard.  Rotate injection sites.   How does this medication need to be stored? Store in refrigerator and keep dry.   How should I dispose of this medication? You can dispose of the empty syringe in a sharps container, and if this is not available you may use an empty hard plastic container such as a milk jug or tide container.            Resources/Support   How can I remind myself to take this medication? You can download a reminder brett on your phone or use a calandar  to help with your monthly injection.   Is financial support available?  Yes, [employer] can provide co-pay cards if you have commercial insurance or patient assistance if you have Medicare or no insurance.    Which vaccines are recommended for me? Talk to your doctor about these vaccines: Flu, Coronavirus (COVID-19), Pneumococcal (pneumonia), Tdap, Hepatitis B, Zoster (shingles)             Adherence, Self-Administration, and Current Therapy Problems  Adherence related to the patient's specialty therapy was discussed with the patient. The Adherence segment of this outreach has been reviewed and updated.     Is there a concern with patient's ability to self administer the medication correctly and without issue?: No  Were any potential barriers to adherence identified during the initial assessment or patient education?: No  Are there any concerns regarding the patient's understanding of the importance of medication adherence?: No    Adherence Questions  Linked Medication(s) Assessed: Onabotulinumtoxina  On average, how many doses/injections does the patient miss per month?: 0  What are the identified reasons for non-adherence or missed doses? : no problems identified  What is the estimated medication adherence level?: %  Based on the patient/caregiver response and refill history, does this patient require an MTP  to track adherence improvements?: no    Additional Barriers to Patient Self-Administration: none  Methods for Supporting Patient Self-Administration: Pt previously on Emgality and adept with Emgality self-injections.    Recently Close Medication Therapy Problems  No medication therapy recommendations to display  Open Medication Therapy Problems  No medication therapy recommendations to display     Adverse Drug Reactions  Medication tolerability: Tolerating with no to minimal ADRs          Medication plan: Continue therapy with normal follow-up  Plan for ADR Management: not required    Goals of Therapy  Goals related to the patient's specialty therapy were discussed with the patient. The Patient Goals segment of this outreach has been reviewed and updated.   Goals Addressed Today        Specialty Pharmacy General Goal      Decrease frequency, severity and duration of migraine attacks from baseline.                   Quality of Life Assessment   Quality of Life related to the patient's enrollment in the patient management program and services provided was discussed with the patient. The QOL segment of this outreach has been reviewed and updated.   Quality of Life Improvement Scale: 8-Moderately better    Reassessment Plan & Follow-Up  Medication Therapy Changes: D/C Aimovig (formulary change) and start Emgality 240mg subq monthly and continue Botox 155 units every 3 months per provider.  Related Plans, Therapy Recommendations, or Issues to Be Addressed: none   Pharmacist to perform regular reassessments no more than (6) months from the previous assessment.  Care Coordinator to set up future refill outreaches, coordinate prescription delivery, and escalate clinical questions to pharmacist.     Attestation  Therapeutic appropriateness: Appropriate  I attest the patient was actively involved in and has agreed to the above plan of care. If the prescribed therapy is at any point deemed not appropriate based on the current  or future assessments, a consultation will be initiated with the patient's specialty care provider to determine the best course of action. The revised plan of therapy will be documented along with any additional patient education provided. Discussed aforementioned material with patient via telemedicine.    Yessica UrbanD, [facility]  Clinic Specialty Pharmacist, Neurology  1/17/2024  15:37 EST

## 2024-01-17 NOTE — PROGRESS NOTES
Specialty Pharmacy Refill Coordination Note     Ivette is a 43 y.o. female contacted today regarding initial fill of Emgality specialty medication(s).    Reviewed and verified with patient:  Allergies  Meds  Problems       Specialty medication(s) and dose(s) confirmed: yes        Delivery Questions      Flowsheet Row Most Recent Value   Delivery method [employer]   Delivery address verified with patient/caregiver? Yes   Delivery address Home   Number of medications in delivery 1   Medication(s) being filled and delivered Galcanezumab-Gnlm   Doses left of specialty medications new start   Copay verified? Yes   Copay amount Emgality co-pay $0   Copay form of payment No copayment ($0)              Medication Adherence          Adherence tools used: calendar      Support network for adherence: healthcare provider                Follow-up: 28 day(s)     Nova Forman, YessicaD

## 2024-01-18 RX ORDER — SERTRALINE HYDROCHLORIDE 100 MG/1
150 TABLET, FILM COATED ORAL DAILY
Qty: 135 TABLET | Refills: 3 | Status: SHIPPED | OUTPATIENT
Start: 2024-01-18

## 2024-02-08 ENCOUNTER — SPECIALTY PHARMACY (OUTPATIENT)
Dept: ONCOLOGY | Facility: HOSPITAL | Age: 44
End: 2024-02-08
Payer: COMMERCIAL

## 2024-02-08 NOTE — PROGRESS NOTES
Specialty Pharmacy Refill Coordination Note     Ivette is a 43 y.o. female contacted today regarding refills of Botox and Emgality specialty medication(s).. patient Injection is due 02/24    Reviewed and verified with patient:       Specialty medication(s) and dose(s) confirmed: yes    Refill Questions      Flowsheet Row Most Recent Value   Changes to allergies? No   Changes to medications? No   New conditions or infections since last clinic visit No   Unplanned office visit, urgent care, ED, or hospital admission in the last 4 weeks  No   How does patient/caregiver feel medication is working? Very good   Financial problems or insurance changes  No   If yes, describe changes in insurance or financial issues. N/A   Since the previous refill, were any specialty medication doses or scheduled injections missed or delayed?  No   If yes, please provide the amount N/A   Why were doses missed? N/A   Does this patient require a clinical escalation to a pharmacist? No            Delivery Questions      Flowsheet Row Most Recent Value   Delivery method [employer]   Delivery address verified with patient/caregiver? Yes   Delivery address Home   Number of medications in delivery 2   Medication(s) being filled and delivered Onabotulinumtoxina, Galcanezumab-Gnlm   Doses left of specialty medications N/A   Copay verified? Yes   Copay amount N/A   Copay form of payment No copayment ($0)              Medication Adherence          Adherence tools used: calendar      Support network for adherence: healthcare provider                Follow-up: 28 day(s)     Viraj Masterson  Specialty Pharmacy Technician

## 2024-02-14 ENCOUNTER — CLINICAL SUPPORT (OUTPATIENT)
Dept: FAMILY MEDICINE CLINIC | Facility: CLINIC | Age: 44
End: 2024-02-14
Payer: COMMERCIAL

## 2024-02-14 DIAGNOSIS — G43.711 INTRACTABLE CHRONIC MIGRAINE WITHOUT AURA AND WITH STATUS MIGRAINOSUS: Primary | ICD-10-CM

## 2024-02-14 DIAGNOSIS — G44.029 CHRONIC CLUSTER HEADACHE, NOT INTRACTABLE: Primary | ICD-10-CM

## 2024-02-14 RX ORDER — TRIAMCINOLONE ACETONIDE 40 MG/ML
80 INJECTION, SUSPENSION INTRA-ARTICULAR; INTRAMUSCULAR ONCE
Status: COMPLETED | OUTPATIENT
Start: 2024-02-14 | End: 2024-02-14

## 2024-02-14 RX ORDER — KETOROLAC TROMETHAMINE 30 MG/ML
60 INJECTION, SOLUTION INTRAMUSCULAR; INTRAVENOUS ONCE
Status: COMPLETED | OUTPATIENT
Start: 2024-02-14 | End: 2024-02-14

## 2024-02-14 RX ADMIN — KETOROLAC TROMETHAMINE 60 MG: 30 INJECTION, SOLUTION INTRAMUSCULAR; INTRAVENOUS at 13:46

## 2024-02-14 RX ADMIN — TRIAMCINOLONE ACETONIDE 80 MG: 40 INJECTION, SUSPENSION INTRA-ARTICULAR; INTRAMUSCULAR at 13:49

## 2024-02-29 ENCOUNTER — PROCEDURE VISIT (OUTPATIENT)
Dept: NEUROLOGY | Facility: CLINIC | Age: 44
End: 2024-02-29
Payer: COMMERCIAL

## 2024-02-29 DIAGNOSIS — G43.009 MIGRAINE WITHOUT AURA AND WITHOUT STATUS MIGRAINOSUS, NOT INTRACTABLE: Primary | ICD-10-CM

## 2024-02-29 RX ORDER — PROMETHAZINE HYDROCHLORIDE 25 MG/1
25 SUPPOSITORY RECTAL EVERY 6 HOURS PRN
Qty: 24 SUPPOSITORY | Refills: 5 | Status: SHIPPED | OUTPATIENT
Start: 2024-02-29

## 2024-02-29 RX ORDER — ONDANSETRON 8 MG/1
8 TABLET, ORALLY DISINTEGRATING ORAL EVERY 8 HOURS PRN
Qty: 24 TABLET | Refills: 5 | Status: SHIPPED | OUTPATIENT
Start: 2024-02-29

## 2024-02-29 NOTE — PROGRESS NOTES
Botox Procedure Note       Patient Name: Ivette Hughes  : 1980   MRN: [id number]   PCP: Dr Macias  Chief Complaint:    Chief Complaint   Patient presents with    Botulinum Toxin Injection    Patient supplied       History of Present Illness: Ivette Hughes is a 43 y.o. female who is here today for Botox injections for migraines.   Pt migraines have not been controlled the last month. Having severe daily migraines. Treated with multiple rounds of steroids.    We have discussed risk and benefits of this Botox procedure and common side effects including headache, bleeding, infection, worsening of pain, neck pain, neck stiffness or weakness, damage to the areas being injected, weakness of muscles, loss of muscle control, ptosis, flu-like symptoms as well as more serious possible adverse effects including possible dysphagia, facial droop if injecting the facial area, painful injection, respiratory distress or even death (death has only been reported once with adults for Botox for migraines [place] when mixed with lidocaine solution which we do not use lidocaine solution in our practice for mixing Botox). The patient verbally understands and accepts risks and agrees with moving forward with Botox injections for chronic migraine prevention.    Verbal and written consent has been obtained from the patient prior to beginning treatment injections.     Pertinent Medical History:  Response to treatment has reduced headaches at least 7 fewer days a month and / or 100 hours fewer each month.       Subjective          The following portions of the patient's history were reviewed an updated as appropriate: past family history, past medical history, past social history, past surgical history.     Medications:     Current Outpatient Medications:     promethazine (PHENERGAN) 25 MG suppository, Insert 1 suppository into the rectum Every 6 (Six) Hours As Needed for Nausea or Vomiting., Disp: 24 suppository, Rfl:  5    ciprofloxacin (Cipro) 500 MG tablet, Take 1 tablet by mouth 2 (Two) Times a Day., Disp: 10 tablet, Rfl: 0    estradiol (VIVELLE-DOT) 0.1 MG/24HR patch, Place 1 patch on the skin as directed by provider 2 (Two) Times a Week., Disp: 24 each, Rfl: 3    fluconazole (Diflucan) 150 MG tablet, Take 1 tab PO on day 1 then 1 tab PO on day 3, Disp: 2 tablet, Rfl: 1    fluticasone (FLONASE) 50 MCG/ACT nasal spray, 1-2 sprays into the nostril(s) as directed by provider Daily., Disp: 18.2 mL, Rfl: 11    galcanezumab-gnlm (EMGALITY) 120 MG/ML auto-injector pen, Inject 1 mL under the skin into the appropriate area as directed Every 28 (Twenty-Eight) Days., Disp: 1 mL, Rfl: 11    hydrocortisone (ANUSOL-HC) 25 MG suppository, Insert 1 suppository into the rectum 2 (Two) Times a Day., Disp: 24 each, Rfl: 1    ibuprofen (ADVIL,MOTRIN) 800 MG tablet, 1 tab PO Q8h PRN moderate pain, Disp: 90 tablet, Rfl: 5    methocarbamol (ROBAXIN) 750 MG tablet, Take 1 tablet by mouth 3 (Three) Times a Day As Needed for Muscle Spasms., Disp: 30 tablet, Rfl: 2    nitrofurantoin, macrocrystal-monohydrate, (Macrobid) 100 MG capsule, Take 1 capsule by mouth 2 (Two) Times a Day., Disp: 14 capsule, Rfl: 0    OnabotulinumtoxinA 200 units reconstituted solution, FOR . PHYSICIAN TO INJECT 155 UNITS INTRAMUSCULARLY INTO HEAD, NECK AND SHOULDERS EVERY 12 WEEKS Per FDA PROTOCOL, Disp: 1 each, Rfl: 3    ondansetron ODT (ZOFRAN-ODT) 8 MG disintegrating tablet, Place 1 tablet on the tongue Every 8 (Eight) Hours As Needed for Nausea or Vomiting., Disp: 24 tablet, Rfl: 5    phenazopyridine (Pyridium) 200 MG tablet, Take 1 tablet by mouth 3 (Three) Times a Day As Needed for Bladder Spasms., Disp: 9 tablet, Rfl: 0    promethazine (PHENERGAN) 25 MG suppository, Insert 1 suppository into the rectum Every 6 (Six) Hours As Needed for Nausea or Vomiting., Disp: 30 suppository, Rfl: 1    sertraline (ZOLOFT) 100 MG tablet, Take 1.5 tablets by mouth  Daily., Disp: 135 tablet, Rfl: 3    sulfamethoxazole-trimethoprim (Bactrim DS) 800-160 MG per tablet, Take 1 tablet by mouth 2 (Two) Times a Day., Disp: 10 tablet, Rfl: 0    traZODone (DESYREL) 100 MG tablet, 1-2 tabs PO QHS, Disp: 180 tablet, Rfl: 1    Current Facility-Administered Medications:     OnabotulinumtoxinA 200 Units, 200 Units, Intramuscular, Q3 Months, Kyra Vázquez, JOHANNA, APRN, 200 Units at 12/06/23 1552    Allergies:   No Known Allergies    Objective     Physical Exam:  Vital Signs: There were no vitals filed for this visit.  There is no height or weight on file to calculate BMI.     Physical Exam  Axox3    BOTOX PROCEDURE:     The patient was placed in a comfortable area and the sites to be treated were identified. A time out was called and performed. The area to be treated was prepped three times with alcohol and the alcohol allowed to dry. Next, a 30 gauge needle was used to inject the medication in the area to be treated.     Date of Last Injection: 12/6/23  Response: good  Onset of response: 1 week   Wearing off: none  Side Effects: none  : Miradore  Lot #: [id number]  Expiration Date: 06/2026  NDC: 39884yw17   Botox is pt supplied    Botox was injected using FDA approved protocol for chronic migraine prevention.   200 unit vial Botox was re-constituted with 4 mL 0.9 NS to 5 unit/0.1 mL using standard techniques.  10 units were given at the  muscle in 2 divided sites  5 units were given at the Procerus muscle  20 units were given at the Frontalis muscle in 4 divided sites  40 units were given at the Temporalis muscle in 8 divided sites  30 units were given at the Occipitalis muscle in 6 divided sites  20 units were given at the Cervical paraspinal muscle in 4 divided sites  30 units were given at the Trapezius muscle in 6 divided sites  5 units were given at the Masseter muscle in 2 divided doses    The total amount injected in units is 160  The total amount wasted in  units is 40  The total amount submitted in units is 200.   Botox was administered by Nurse practitioner.     Patient tolerated procedure well with no immediate complications.     Assessment / Plan      Assessment/Plan:   Diagnoses and all orders for this visit:    1. Migraine without aura and without status migrainosus, not intractable (Primary)  -     promethazine (PHENERGAN) 25 MG suppository; Insert 1 suppository into the rectum Every 6 (Six) Hours As Needed for Nausea or Vomiting.  Dispense: 24 suppository; Refill: 5  -     ondansetron ODT (ZOFRAN-ODT) 8 MG disintegrating tablet; Place 1 tablet on the tongue Every 8 (Eight) Hours As Needed for Nausea or Vomiting.  Dispense: 24 tablet; Refill: 5  -     Ambulatory Referral to Pain Management         Patient Education:       Reviewed medications, potential side effects and signs and symptoms to report. Discussed risk versus benefits of treatment plan with patient and/or family-including medications, labs and radiology that may be ordered. Addressed questions and concerns during visit. Patient and/or family verbalized understanding and agree with plan. Instructed to call the office with any questions and report to ER with any life-threatening symptoms.     Follow Up:   It has been determined that this pt has chronic migraine headaches. We will proceed with a 12 week regimen of 200 units of Botox injections, to treat the patient's chronic migraines.   3 months    During this visit the following were done:  Labs Reviewed []    Labs Ordered []    Radiology Reports Reviewed []    Radiology Ordered []    PCP Records Reviewed []    Referring Provider Records Reviewed []    ER Records Reviewed []    Hospital Records Reviewed []    History Obtained From Family []    Radiology Images Reviewed []    Other Reviewed []    Records Requested []        Kyra Vázquez, DNP, APRN

## 2024-03-07 ENCOUNTER — SPECIALTY PHARMACY (OUTPATIENT)
Dept: ONCOLOGY | Facility: HOSPITAL | Age: 44
End: 2024-03-07
Payer: COMMERCIAL

## 2024-03-07 NOTE — PROGRESS NOTES
Specialty Pharmacy Refill Coordination Note     Ivette is a 43 y.o. female contacted today regarding refills of Emgality specialty medication(s).. patient injection is due on 03/23    Reviewed and verified with patient:       Specialty medication(s) and dose(s) confirmed: yes    Refill Questions      Flowsheet Row Most Recent Value   Changes to allergies? No   Changes to medications? No   New conditions or infections since last clinic visit No   Unplanned office visit, urgent care, ED, or hospital admission in the last 4 weeks  No   How does patient/caregiver feel medication is working? Very good   Financial problems or insurance changes  No   If yes, describe changes in insurance or financial issues. N/A   Since the previous refill, were any specialty medication doses or scheduled injections missed or delayed?  No   If yes, please provide the amount N/A   Why were doses missed? N/A   Does this patient require a clinical escalation to a pharmacist? No            Delivery Questions      Flowsheet Row Most Recent Value   Delivery method [employer]   Delivery address verified with patient/caregiver? Yes   Delivery address Home   Number of medications in delivery 1   Medication(s) being filled and delivered Galcanezumab-[facility]   Doses left of specialty medications N/A   Copay verified? Yes   Copay amount N/A   Copay form of payment No copayment ($0)              Medication Adherence    Adherence tools used: calendar  Support network for adherence: healthcare provider          Follow-up: 28 day(s)     Viraj Masterson  Specialty Pharmacy Technician

## 2024-04-03 ENCOUNTER — SPECIALTY PHARMACY (OUTPATIENT)
Dept: ONCOLOGY | Facility: HOSPITAL | Age: 44
End: 2024-04-03
Payer: COMMERCIAL

## 2024-04-03 NOTE — PROGRESS NOTES
Specialty Pharmacy Refill Coordination Note     Ivette is a 43 y.o. female contacted today regarding refills of  Emgality specialty medication(s). Patient is due for injection on 4/23.      Reviewed and verified with patient:       Specialty medication(s) and dose(s) confirmed: yes    Refill Questions      Flowsheet Row Most Recent Value   Changes to allergies? No   Changes to medications? No   New conditions or infections since last clinic visit No   Unplanned office visit, urgent care, ED, or hospital admission in the last 4 weeks  No   How does patient/caregiver feel medication is working? Very good   Financial problems or insurance changes  No   If yes, describe changes in insurance or financial issues. N/A   Since the previous refill, were any specialty medication doses or scheduled injections missed or delayed?  No   If yes, please provide the amount N/A   Why were doses missed? N/A   Does this patient require a clinical escalation to a pharmacist? No            Delivery Questions      Flowsheet Row Most Recent Value   Delivery method [employer]   Delivery address verified with patient/caregiver? Yes   Delivery address Home   Number of medications in delivery 1   Medication(s) being filled and delivered Galcanezumab-[facility]   Doses left of specialty medications Emgality=0   Copay verified? Yes   Copay amount Emgality=$0   Copay form of payment No copayment ($0)              Medication Adherence    Adherence tools used: calendar  Support network for adherence: healthcare provider          Follow-up: 28 day(s)     Ne Caraballo, Pharmacy Technician  Specialty Pharmacy Technician

## 2024-04-28 RX ORDER — TRAZODONE HYDROCHLORIDE 100 MG/1
TABLET ORAL
Qty: 180 TABLET | Refills: 1 | Status: SHIPPED | OUTPATIENT
Start: 2024-04-28

## 2024-05-06 ENCOUNTER — SPECIALTY PHARMACY (OUTPATIENT)
Dept: ONCOLOGY | Facility: HOSPITAL | Age: 44
End: 2024-05-06
Payer: COMMERCIAL

## 2024-05-07 RX ORDER — IBUPROFEN 800 MG/1
TABLET ORAL
Qty: 90 TABLET | Refills: 5 | Status: SHIPPED | OUTPATIENT
Start: 2024-05-07

## 2024-05-13 DIAGNOSIS — R10.2 SUPRAPUBIC PAIN, ACUTE: ICD-10-CM

## 2024-05-13 DIAGNOSIS — Z87.442 HISTORY OF NEPHROLITHIASIS: Primary | ICD-10-CM

## 2024-05-13 LAB
BILIRUB BLD-MCNC: NEGATIVE MG/DL
CLARITY, POC: ABNORMAL
COLOR UR: YELLOW
EXPIRATION DATE: ABNORMAL
GLUCOSE UR STRIP-MCNC: NEGATIVE MG/DL
KETONES UR QL: NEGATIVE
LEUKOCYTE EST, POC: NEGATIVE
Lab: ABNORMAL
NITRITE UR-MCNC: NEGATIVE MG/ML
PH UR: 7.5 [PH] (ref 5–8)
PROT UR STRIP-MCNC: NEGATIVE MG/DL
RBC # UR STRIP: NEGATIVE /UL
SP GR UR: 1.02 (ref 1–1.03)
UROBILINOGEN UR QL: ABNORMAL

## 2024-05-13 PROCEDURE — 81003 URINALYSIS AUTO W/O SCOPE: CPT | Performed by: FAMILY MEDICINE

## 2024-05-13 RX ORDER — HYDROCODONE BITARTRATE AND ACETAMINOPHEN 5; 325 MG/1; MG/1
1 TABLET ORAL EVERY 6 HOURS PRN
Qty: 12 TABLET | Refills: 0 | Status: SHIPPED | OUTPATIENT
Start: 2024-05-13

## 2024-05-13 RX ORDER — TAMSULOSIN HYDROCHLORIDE 0.4 MG/1
1 CAPSULE ORAL DAILY
Qty: 30 CAPSULE | Refills: 0 | Status: SHIPPED | OUTPATIENT
Start: 2024-05-13

## 2024-05-16 ENCOUNTER — TRANSCRIBE ORDERS (OUTPATIENT)
Dept: GENERAL RADIOLOGY | Facility: CLINIC | Age: 44
End: 2024-05-16
Payer: COMMERCIAL

## 2024-05-16 DIAGNOSIS — R10.9 ABDOMINAL PAIN, UNSPECIFIED ABDOMINAL LOCATION: Primary | ICD-10-CM

## 2024-05-29 ENCOUNTER — TELEPHONE (OUTPATIENT)
Dept: NEUROLOGY | Facility: CLINIC | Age: 44
End: 2024-05-29

## 2024-05-29 ENCOUNTER — SPECIALTY PHARMACY (OUTPATIENT)
Dept: ONCOLOGY | Facility: HOSPITAL | Age: 44
End: 2024-05-29
Payer: COMMERCIAL

## 2024-05-29 ENCOUNTER — PROCEDURE VISIT (OUTPATIENT)
Dept: NEUROLOGY | Facility: CLINIC | Age: 44
End: 2024-05-29
Payer: COMMERCIAL

## 2024-05-29 DIAGNOSIS — G43.009 MIGRAINE WITHOUT AURA AND WITHOUT STATUS MIGRAINOSUS, NOT INTRACTABLE: Primary | ICD-10-CM

## 2024-05-29 PROCEDURE — 64615 CHEMODENERV MUSC MIGRAINE: CPT | Performed by: NURSE PRACTITIONER

## 2024-05-29 NOTE — PROGRESS NOTES
Specialty Pharmacy Refill Coordination Note     Ivette is a 43 y.o. female contacted today regarding refills of Emgality specialty medication(s)..Patient is due for injection on 06/20.    Reviewed and verified with patient:       Specialty medication(s) and dose(s) confirmed: yes    Refill Questions      Flowsheet Row Most Recent Value   Changes to allergies? No   Changes to medications? No   New conditions or infections since last clinic visit No   Unplanned office visit, urgent care, ED, or hospital admission in the last 4 weeks  No   How does patient/caregiver feel medication is working? Good   Financial problems or insurance changes  No   If yes, describe changes in insurance or financial issues. N/A   Since the previous refill, were any specialty medication doses or scheduled injections missed or delayed?  No   If yes, please provide the amount N/A   Why were doses missed? N/A   Does this patient require a clinical escalation to a pharmacist? No            Delivery Questions      Flowsheet Row Most Recent Value   Delivery method [employer]   Delivery address verified with patient/caregiver? Yes   Delivery address Home   Number of medications in delivery 1   Medication(s) being filled and delivered Galcanezumab-[facility]   Copay verified? Yes   Copay amount N/A   Copay form of payment No copayment ($0)              Medication Adherence    Adherence tools used: calendar  Support network for adherence: healthcare provider          Follow-up: 28 day(s)     Viraj Masterson  Specialty Pharmacy Technician

## 2024-05-29 NOTE — PROGRESS NOTES
Botox Procedure Note       Patient Name: Ivette Hughes  : 1980   MRN: [id number]   PCP: Dr Macias  Chief Complaint:    Chief Complaint   Patient presents with    Botulinum Toxin Injection     History of Present Illness: Ivette Hughes is a 43 y.o. female who is here today for Botox injections for migraines.     Pt headaches improved. Only 3 headaches in last 3 months. Would like to continue  botox.    We have discussed risk and benefits of this Botox procedure and common side effects including headache, bleeding, infection, worsening of pain, neck pain, neck stiffness or weakness, damage to the areas being injected, weakness of muscles, loss of muscle control, ptosis, flu-like symptoms as well as more serious possible adverse effects including possible dysphagia, facial droop if injecting the facial area, painful injection, respiratory distress or even death (death has only been reported once with adults for Botox for migraines [place] when mixed with lidocaine solution which we do not use lidocaine solution in our practice for mixing Botox). The patient verbally understands and accepts risks and agrees with moving forward with Botox injections for chronic migraine prevention.    Verbal and written consent has been obtained from the patient prior to beginning treatment injections.     Pertinent Medical History:  Response to treatment has reduced headaches at least 7 fewer days a month and / or 100 hours fewer each month.       Subjective          The following portions of the patient's history were reviewed an updated as appropriate: past family history, past medical history, past social history, past surgical history.     Medications:     Current Outpatient Medications:     estradiol (VIVELLE-DOT) 0.1 MG/24HR patch, Place 1 patch on the skin as directed by provider 2 (Two) Times a Week., Disp: 24 each, Rfl: 3    fluconazole (Diflucan) 150 MG tablet, Take 1 tab PO on day 1 then 1 tab PO on  day 3, Disp: 2 tablet, Rfl: 1    fluticasone (FLONASE) 50 MCG/ACT nasal spray, 1-2 sprays into the nostril(s) as directed by provider Daily., Disp: 18.2 mL, Rfl: 11    HYDROcodone-acetaminophen (NORCO) 5-325 MG per tablet, Take 1 tablet by mouth Every 6 (Six) Hours As Needed for Moderate Pain., Disp: 12 tablet, Rfl: 0    hydrocortisone (ANUSOL-HC) 25 MG suppository, Insert 1 suppository into the rectum 2 (Two) Times a Day., Disp: 24 each, Rfl: 1    ibuprofen (ADVIL,MOTRIN) 800 MG tablet, 1 tab PO Q8h PRN moderate pain, Disp: 90 tablet, Rfl: 5    methocarbamol (ROBAXIN) 750 MG tablet, Take 1 tablet by mouth 3 (Three) Times a Day As Needed for Muscle Spasms., Disp: 30 tablet, Rfl: 2    nitrofurantoin, macrocrystal-monohydrate, (Macrobid) 100 MG capsule, Take 1 capsule by mouth 2 (Two) Times a Day., Disp: 14 capsule, Rfl: 0    OnabotulinumtoxinA 200 units reconstituted solution, FOR . PHYSICIAN TO INJECT 155 UNITS INTRAMUSCULARLY INTO HEAD, NECK AND SHOULDERS EVERY 12 WEEKS Per FDA PROTOCOL, Disp: 1 each, Rfl: 3    ondansetron ODT (ZOFRAN-ODT) 8 MG disintegrating tablet, Place 1 tablet on the tongue Every 8 (Eight) Hours As Needed for Nausea or Vomiting., Disp: 24 tablet, Rfl: 5    phenazopyridine (Pyridium) 200 MG tablet, Take 1 tablet by mouth 3 (Three) Times a Day As Needed for Bladder Spasms., Disp: 9 tablet, Rfl: 0    promethazine (PHENERGAN) 25 MG suppository, Insert 1 suppository into the rectum Every 6 (Six) Hours As Needed for Nausea or Vomiting., Disp: 30 suppository, Rfl: 1    promethazine (PHENERGAN) 25 MG suppository, Insert 1 suppository into the rectum Every 6 (Six) Hours As Needed for Nausea or Vomiting., Disp: 24 suppository, Rfl: 5    sertraline (ZOLOFT) 100 MG tablet, Take 1.5 tablets by mouth Daily., Disp: 135 tablet, Rfl: 3    tamsulosin (FLOMAX) 0.4 MG capsule 24 hr capsule, Take 1 capsule by mouth Daily., Disp: 30 capsule, Rfl: 0    traZODone (DESYREL) 100 MG tablet, TAKE ONE  TO TWO TABLETS BY MOUTH EVERY NIGHT AT BEDTIME, Disp: 180 tablet, Rfl: 1    Current Facility-Administered Medications:     OnabotulinumtoxinA 200 Units, 200 Units, Intramuscular, Q3 Months, Kyra Vázquez, JOHANNA, APRN, 200 Units at 02/29/24 1053    Allergies:   No Known Allergies    Objective     Physical Exam:  Alert and oriented x3    Vital Signs:   BOTOX PROCEDURE:     The patient was placed in a comfortable area and the sites to be treated were identified. A time out was called and performed. The area to be treated was prepped three times with alcohol and the alcohol allowed to dry. Next, a 30 gauge needle was used to inject the medication in the area to be treated.     Date of Last Injection: 2/29/2024  Response: good  Onset of response: 1 week   Wearing off: none  Side Effects: none  : AllergRoom  Lot #: [id number]  Expiration Date: 06/2026  NDC: 96484dm14   Botox is patient supplied    Botox was injected using FDA approved protocol for chronic migraine prevention.   200 unit vial Botox was re-constituted with 4 mL 0.9 NS to 5 unit/0.1 mL using standard techniques.  10 units were given at the  muscle in 2 divided sites  5 units were given at the Procerus muscle  20 units were given at the Frontalis muscle in 4 divided sites  40 units were given at the Temporalis muscle in 8 divided sites  30 units were given at the Occipitalis muscle in 6 divided sites  20 units were given at the Cervical paraspinal muscle in 4 divided sites  30 units were given at the Trapezius muscle in 6 divided sites  5 units were given at the Masster muscle in 2 divided doses.    The total amount injected in units is 160  The total amount wasted in units is 40  The total amount submitted in units is 200.   Botox was administered by Nurse practitioner.     Patient tolerated procedure well with no immediate complications.     Assessment / Plan      Assessment/Plan:   Diagnoses and all orders for this visit:    1.  Migraine without aura and without status migrainosus, not intractable (Primary)  Comments:  Cont botox         Patient Education:       Reviewed medications, potential side effects and signs and symptoms to report. Discussed risk versus benefits of treatment plan with patient and/or family-including medications, labs and radiology that may be ordered. Addressed questions and concerns during visit. Patient and/or family verbalized understanding and agree with plan. Instructed to call the office with any questions and report to ER with any life-threatening symptoms.     Follow Up:   It has been determined that this patient has chronic migraine headaches. We will proceed with a 12 week regimen of 200 units of Botox injections, to treat the patient's chronic migraines.    Follow up in 3 months    During this visit the following were done:  Labs Reviewed []    Labs Ordered []    Radiology Reports Reviewed []    Radiology Ordered []    PCP Records Reviewed []    Referring Provider Records Reviewed []    ER Records Reviewed []    Hospital Records Reviewed []    History Obtained From Family []    Radiology Images Reviewed []    Other Reviewed []    Records Requested []        Kyra Vázquez, DNP, APRN

## 2024-05-30 ENCOUNTER — DOCUMENTATION (OUTPATIENT)
Dept: FAMILY MEDICINE CLINIC | Facility: CLINIC | Age: 44
End: 2024-05-30
Payer: COMMERCIAL

## 2024-05-30 RX ORDER — FLUCONAZOLE 150 MG/1
TABLET ORAL
Qty: 2 TABLET | Refills: 1 | Status: SHIPPED | OUTPATIENT
Start: 2024-05-30

## 2024-05-30 RX ORDER — NITROFURANTOIN 25; 75 MG/1; MG/1
100 CAPSULE ORAL 2 TIMES DAILY
Qty: 20 CAPSULE | Refills: 0 | Status: SHIPPED | OUTPATIENT
Start: 2024-05-30

## 2024-06-08 RX ORDER — TAMSULOSIN HYDROCHLORIDE 0.4 MG/1
1 CAPSULE ORAL DAILY
Qty: 30 CAPSULE | Refills: 0 | OUTPATIENT
Start: 2024-06-08

## 2024-07-01 ENCOUNTER — SPECIALTY PHARMACY (OUTPATIENT)
Dept: ONCOLOGY | Facility: HOSPITAL | Age: 44
End: 2024-07-01
Payer: COMMERCIAL

## 2024-07-01 NOTE — PROGRESS NOTES
Specialty Pharmacy Patient Management Program  Neurology Reassessment     Ivette Hughes is a 43 y.o. female with migraines seen by a Neurology provider and enrolled in the Neurology Patient Management program offered by [facility].  A follow-up outreach was conducted, including assessment of continued therapy appropriateness, medication adherence, and side effect incidence and management for Emgaltiy and Botox.     Changes to Insurance Coverage or Financial Support  No changes     Relevant Past Medical History and Comorbidities  Relevant medical history and concomitant health conditions were discussed with the patient. The patient's chart has been reviewed for relevant past medical history and comorbid health conditions and updated as necessary.   Past Medical History:   Diagnosis Date    Anxiety     Cluster headache     Gall stones     Insomnia     Kidney stones     Migraine headache     Panic attack     Tension headache      Social History     Socioeconomic History    Marital status:    Tobacco Use    Smoking status: Never    Smokeless tobacco: Never   Vaping Use    Vaping status: Never Used   Substance and Sexual Activity    Alcohol use: Never    Drug use: Never    Sexual activity: Defer     Problem list reviewed by Nova Forman PharmD on 7/1/2024 at 12:03 PM    Hospitalizations and Urgent Care Since Last Assessment  ED Visits, Admissions, or Hospitalizations: none   Urgent Office Visits: none     Allergies  Known allergies and reactions were discussed with the patient. The patient's chart has been reviewed for allergy information and updated as necessary.   No Known Allergies  Allergies reviewed by Nova Forman PharmD on 7/1/2024 at 12:03 PM      Current Medication List  This medication list has been reviewed with the patient and evaluated for any interactions or necessary modifications/recommendations, and updated to include all prescription medications, OTC  medications, and supplements the patient is currently taking.  This list reflects what is contained in the patient's profile, which has also been marked as reviewed to communicate to other providers it is the most up to date version of the patient's current medication therapy.     Current Outpatient Medications:     estradiol (VIVELLE-DOT) 0.1 MG/24HR patch, Place 1 patch on the skin as directed by provider 2 (Two) Times a Week., Disp: 24 each, Rfl: 3    fluconazole (Diflucan) 150 MG tablet, Take 1 tab PO on day 1 then 1 tab PO on day 3, Disp: 2 tablet, Rfl: 1    fluticasone (FLONASE) 50 MCG/ACT nasal spray, 1-2 sprays into the nostril(s) as directed by provider Daily., Disp: 18.2 mL, Rfl: 11    galcanezumab-gnlm (EMGALITY) 120 MG/ML auto-injector pen, Inject 1 mL under the skin into the appropriate area as directed Every 28 (Twenty-Eight) Days., Disp: 1 mL, Rfl: 11    HYDROcodone-acetaminophen (NORCO) 5-325 MG per tablet, Take 1 tablet by mouth Every 6 (Six) Hours As Needed for Moderate Pain., Disp: 12 tablet, Rfl: 0    hydrocortisone (ANUSOL-HC) 25 MG suppository, Insert 1 suppository into the rectum 2 (Two) Times a Day., Disp: 24 each, Rfl: 1    ibuprofen (ADVIL,MOTRIN) 800 MG tablet, 1 tab PO Q8h PRN moderate pain, Disp: 90 tablet, Rfl: 5    methocarbamol (ROBAXIN) 750 MG tablet, Take 1 tablet by mouth 3 (Three) Times a Day As Needed for Muscle Spasms., Disp: 30 tablet, Rfl: 2    nitrofurantoin, macrocrystal-monohydrate, (Macrobid) 100 MG capsule, Take 1 capsule by mouth 2 (Two) Times a Day., Disp: 20 capsule, Rfl: 0    OnabotulinumtoxinA 200 units reconstituted solution, FOR . PHYSICIAN TO INJECT 155 UNITS INTRAMUSCULARLY INTO HEAD, NECK AND SHOULDERS EVERY 12 WEEKS Per FDA PROTOCOL, Disp: 1 each, Rfl: 3    ondansetron ODT (ZOFRAN-ODT) 8 MG disintegrating tablet, Place 1 tablet on the tongue Every 8 (Eight) Hours As Needed for Nausea or Vomiting., Disp: 24 tablet, Rfl: 5    phenazopyridine  (Pyridium) 200 MG tablet, Take 1 tablet by mouth 3 (Three) Times a Day As Needed for Bladder Spasms., Disp: 9 tablet, Rfl: 0    promethazine (PHENERGAN) 25 MG suppository, Insert 1 suppository into the rectum Every 6 (Six) Hours As Needed for Nausea or Vomiting., Disp: 30 suppository, Rfl: 1    promethazine (PHENERGAN) 25 MG suppository, Insert 1 suppository into the rectum Every 6 (Six) Hours As Needed for Nausea or Vomiting., Disp: 24 suppository, Rfl: 5    sertraline (ZOLOFT) 100 MG tablet, Take 1.5 tablets by mouth Daily., Disp: 135 tablet, Rfl: 3    tamsulosin (FLOMAX) 0.4 MG capsule 24 hr capsule, Take 1 capsule by mouth Daily., Disp: 30 capsule, Rfl: 0    traZODone (DESYREL) 100 MG tablet, TAKE ONE TO TWO TABLETS BY MOUTH EVERY NIGHT AT BEDTIME, Disp: 180 tablet, Rfl: 1    Current Facility-Administered Medications:     OnabotulinumtoxinA 200 Units, 200 Units, Intramuscular, Q3 Months, Kyra Vázquez, DNP, APRN, 200 Units at 05/29/24 1129    Medicines reviewed by Nova Forman, PharmD on 7/1/2024 at 12:03 PM    Drug Interactions  No relevant drug-drug interactions with specialty medication(s):  Botox or Emgality.        Adverse Drug Reactions  Medication tolerability: Tolerating with no to minimal ADRs          Medication plan: Continue therapy with normal follow-up  Plan for ADR Management: not required      Adherence, Self-Administration, and Current Therapy Problems  Adherence related patient's specialty therapy was discussed with the patient. The Adherence segment of this outreach has been reviewed and updated.   Adherence Questions  Linked Medication(s) Assessed: Galcanezumab-Gnlm, Onabotulinumtoxina  On average, how many doses/injections does the patient miss per month?: 0  What are the identified reasons for non-adherence or missed doses? : no problems identified  What is the estimated medication adherence level?: %  Based on the patient/caregiver response and refill history, does this  patient require an MTP to track adherence improvements?: no    Additional Barriers to Patient Self-Administration: none  Methods for Supporting Patient Self-Administration: pt adept with Emgality self-injections.    Recently Close Medication Therapy Problems  No medication therapy recommendations to display  Open Medication Therapy Problems  No medication therapy recommendations to display     Goals of Therapy  Goals related to the patient's specialty therapy was discussed with the patient. The Patient Goals segment of this outreach has been reviewed and updated.    Goals Addressed Today        Specialty Pharmacy General Goal      Decrease frequency, severity and duration of migraine attacks from baseline.                   Quality of Life Assessment   Quality of Life related to the patient's enrollment in the patient management program and services provided was discussed with the patient. The QOL segment of this outreach has been reviewed and updated.   Quality of Life Improvement Scale: 8-Moderately better    Reassessment Plan & Follow-Up  Medication Therapy Changes: Continue Emgality 120 mg subq monthly and Botox 155 units administered per provider every 3 months.  Related Plans, Therapy Recommendations, or Issues to Be Addressed: none  Pharmacist to perform regular reassessments no more than (6) months from the previous assessment.  Care Coordinator to set up future refill outreaches, coordinate prescription delivery, and escalate clinical questions to pharmacist.     Attestation  Therapeutic appropriateness: Appropriate  I attest the patient was actively involved in and has agreed to the above plan of care. If the prescribed therapy is at any point deemed not appropriate based on the current or future assessments, a consultation will be initiated with the patient's specialty care provider to determine the best course of action. The revised plan of therapy will be documented along with any additional patient  education provided. Discussed aforementioned material with patient by phone.    Nova Forman, PharmD, BCPS  Clinic Specialty Pharmacist, Neurology  7/1/2024  12:06 EDT

## 2024-07-01 NOTE — PROGRESS NOTES
Specialty Pharmacy Patient Management Program  [facility]      Ivette is a 43 y.o. female contacted today regarding refills of her medication(s).    Specialty medication(s) and dose(s) confirmed: Emgalty  Other medications being refilled: none    Refill Questions      Flowsheet Row Most Recent Value   Changes to allergies? No   Changes to medications? No   New conditions or infections since last clinic visit No   Unplanned office visit, urgent care, ED, or hospital admission in the last 4 weeks  No   How does patient/caregiver feel medication is working? Good   Financial problems or insurance changes  No   If yes, describe changes in insurance or financial issues. N/A   Since the previous refill, were any specialty medication doses or scheduled injections missed or delayed?  No   If yes, please provide the amount N/A   Why were doses missed? N/A   Does this patient require a clinical escalation to a pharmacist? No            Delivery Questions      Flowsheet Row Most Recent Value   Delivery method [employer]   Delivery address verified with patient/caregiver? Yes   Delivery address Home   Number of medications in delivery 1   Medication(s) being filled and delivered Galcanezumab-Gnlm   Doses left of specialty medications Emgalty = 0   Copay verified? Yes   Copay amount Emgality co-pay $0   Copay form of payment No copayment ($0)            Medication Adherence    Adherence tools used: calendar  Support network for adherence: healthcare provider            Follow-up: 28 days     Nova Forman, PharmD  Specialty Pharmacist  7/1/2024  12:06 EDT

## 2024-07-30 ENCOUNTER — SPECIALTY PHARMACY (OUTPATIENT)
Dept: ONCOLOGY | Facility: HOSPITAL | Age: 44
End: 2024-07-30
Payer: COMMERCIAL

## 2024-07-30 NOTE — PROGRESS NOTES
Specialty Pharmacy Refill Coordination Note     Ivette is a 43 y.o. female contacted today regarding refills of  Emgality and Botox specialty medication(s). Patient is due for injection on 8/20. Patient is scheduled for her botox appointment on 8/22.      Reviewed and verified with patient:       Specialty medication(s) and dose(s) confirmed: yes    Refill Questions      Flowsheet Row Most Recent Value   Changes to allergies? No   Changes to medications? No   New conditions or infections since last clinic visit No   Unplanned office visit, urgent care, ED, or hospital admission in the last 4 weeks  No   How does patient/caregiver feel medication is working? Good   Financial problems or insurance changes  No   If yes, describe changes in insurance or financial issues. N/A   Since the previous refill, were any specialty medication doses or scheduled injections missed or delayed?  No   If yes, please provide the amount N/A   Why were doses missed? N/A   Does this patient require a clinical escalation to a pharmacist? No            Delivery Questions      Flowsheet Row Most Recent Value   Delivery method [employer]   Delivery address verified with patient/caregiver? Yes   [facility]   Number of medications in delivery 2   Medication(s) being filled and delivered Onabotulinumtoxina, Galcanezumab-Gnlm   Doses left of specialty medications Emgalty/Botox= 0   Copay verified? Yes   Copay amount Emgality/Botox co-pay $0   Copay form of payment No copayment ($0)   Ship Date 7/31   Delivery Date 8/1   Signature Required No              Medication Adherence    Adherence tools used: calendar  Support network for adherence: healthcare provider          Follow-up: 28 day(s)     Ne Caraballo, Pharmacy Technician  Specialty Pharmacy Technician

## 2024-08-21 ENCOUNTER — SPECIALTY PHARMACY (OUTPATIENT)
Dept: ONCOLOGY | Facility: HOSPITAL | Age: 44
End: 2024-08-21
Payer: COMMERCIAL

## 2024-08-21 NOTE — PROGRESS NOTES
Specialty Pharmacy Refill Coordination Note     Ivette is a 44 y.o. female contacted today regarding refills of Emgality specialty medication(s).Patient is due for injection on 09/15.    Reviewed and verified with patient:       Specialty medication(s) and dose(s) confirmed: yes    Refill Questions      Flowsheet Row Most Recent Value   Changes to allergies? No   Changes to medications? No   New conditions or infections since last clinic visit No   Unplanned office visit, urgent care, ED, or hospital admission in the last 4 weeks  No   How does patient/caregiver feel medication is working? Good   Financial problems or insurance changes  No   If yes, describe changes in insurance or financial issues. N/A   Since the previous refill, were any specialty medication doses or scheduled injections missed or delayed?  No   If yes, please provide the amount N/A   Why were doses missed? N/A   Does this patient require a clinical escalation to a pharmacist? No            Delivery Questions      Flowsheet Row Most Recent Value   Delivery method UPS   Delivery address Home   Number of medications in delivery 1   Medication(s) being filled and delivered Galcanezumab-Gnlm   Doses left of specialty medications N/A   Copay verified? Yes   Copay amount Emgality =$0   Copay form of payment No copayment ($0)   Ship Date 08/22   Delivery Date 08/23   Signature Required No              Medication Adherence    Adherence tools used: calendar  Support network for adherence: healthcare provider          Follow-up: 28 day(s)     Viraj Masterson  Specialty Pharmacy Technician

## 2024-08-22 ENCOUNTER — PROCEDURE VISIT (OUTPATIENT)
Dept: NEUROLOGY | Facility: CLINIC | Age: 44
End: 2024-08-22
Payer: COMMERCIAL

## 2024-08-22 DIAGNOSIS — G43.009 MIGRAINE WITHOUT AURA AND WITHOUT STATUS MIGRAINOSUS, NOT INTRACTABLE: Primary | ICD-10-CM

## 2024-08-22 NOTE — PROGRESS NOTES
Botox Procedure Note       Patient Name: Ivette Hughes  : 1980   MRN: [id number]   PCP: Dr Macias  Chief Complaint:    Chief Complaint   Patient presents with    Botulinum Toxin Injection     History of Present Illness: Ivette Hughes is a 44 y.o. female who is here today for Botox injections for migraines.     We have discussed risk and benefits of this Botox procedure and common side effects including headache, bleeding, infection, worsening of pain, neck pain, neck stiffness or weakness, damage to the areas being injected, weakness of muscles, loss of muscle control, ptosis, flu-like symptoms as well as more serious possible adverse effects including possible dysphagia, facial droop if injecting the facial area, painful injection, respiratory distress or even death (death has only been reported once with adults for Botox for migraines in another state when mixed with lidocaine solution which we do not use lidocaine solution in our practice for mixing Botox). The patient verbally understands and accepts risks and agrees with moving forward with Botox injections for chronic migraine prevention.    Verbal and written consent has been obtained from the patient prior to beginning treatment injections.     Pertinent Medical History:  Response to treatment has reduced headaches at least 7 fewer days a month and / or 100 hours fewer each month.       Subjective          The following portions of the patient's history were reviewed an updated as appropriate: past family history, past medical history, past social history, past surgical history.     Medications:     Current Outpatient Medications:     estradiol (VIVELLE-DOT) 0.1 MG/24HR patch, Place 1 patch on the skin as directed by provider 2 (Two) Times a Week., Disp: 24 each, Rfl: 3    fluconazole (Diflucan) 150 MG tablet, Take 1 tab PO on day 1 then 1 tab PO on day 3, Disp: 2 tablet, Rfl: 1    fluticasone (FLONASE) 50 MCG/ACT nasal spray, 1-2 sprays into  the nostril(s) as directed by provider Daily., Disp: 18.2 mL, Rfl: 11    galcanezumab-gnlm (EMGALITY) 120 MG/ML auto-injector pen, Inject 1 mL under the skin into the appropriate area as directed Every 28 (Twenty-Eight) Days., Disp: 1 mL, Rfl: 11    HYDROcodone-acetaminophen (NORCO) 5-325 MG per tablet, Take 1 tablet by mouth Every 6 (Six) Hours As Needed for Moderate Pain., Disp: 12 tablet, Rfl: 0    hydrocortisone (ANUSOL-HC) 25 MG suppository, Insert 1 suppository into the rectum 2 (Two) Times a Day., Disp: 24 each, Rfl: 1    ibuprofen (ADVIL,MOTRIN) 800 MG tablet, 1 tab PO Q8h PRN moderate pain, Disp: 90 tablet, Rfl: 5    methocarbamol (ROBAXIN) 750 MG tablet, Take 1 tablet by mouth 3 (Three) Times a Day As Needed for Muscle Spasms., Disp: 30 tablet, Rfl: 2    nitrofurantoin, macrocrystal-monohydrate, (Macrobid) 100 MG capsule, Take 1 capsule by mouth 2 (Two) Times a Day., Disp: 20 capsule, Rfl: 0    OnabotulinumtoxinA 200 units reconstituted solution, FOR . PHYSICIAN TO INJECT 155 UNITS INTRAMUSCULARLY INTO HEAD, NECK AND SHOULDERS EVERY 12 WEEKS Per FDA PROTOCOL, Disp: 1 each, Rfl: 3    ondansetron ODT (ZOFRAN-ODT) 8 MG disintegrating tablet, Place 1 tablet on the tongue Every 8 (Eight) Hours As Needed for Nausea or Vomiting., Disp: 24 tablet, Rfl: 5    phenazopyridine (Pyridium) 200 MG tablet, Take 1 tablet by mouth 3 (Three) Times a Day As Needed for Bladder Spasms., Disp: 9 tablet, Rfl: 0    promethazine (PHENERGAN) 25 MG suppository, Insert 1 suppository into the rectum Every 6 (Six) Hours As Needed for Nausea or Vomiting., Disp: 30 suppository, Rfl: 1    promethazine (PHENERGAN) 25 MG suppository, Insert 1 suppository into the rectum Every 6 (Six) Hours As Needed for Nausea or Vomiting., Disp: 24 suppository, Rfl: 5    sertraline (ZOLOFT) 100 MG tablet, Take 1.5 tablets by mouth Daily., Disp: 135 tablet, Rfl: 3    tamsulosin (FLOMAX) 0.4 MG capsule 24 hr capsule, Take 1 capsule by  mouth Daily., Disp: 30 capsule, Rfl: 0    traZODone (DESYREL) 100 MG tablet, TAKE ONE TO TWO TABLETS BY MOUTH EVERY NIGHT AT BEDTIME, Disp: 180 tablet, Rfl: 1    Current Facility-Administered Medications:     OnabotulinumtoxinA 200 Units, 200 Units, Intramuscular, Q3 Months, Kyra Vázquez, DNP, APRN, 200 Units at 05/29/24 1129    Allergies:   No Known Allergies    Objective     Physical Exam:  Alert and oriented x3    Vital Signs:   BOTOX PROCEDURE:     The patient was placed in a comfortable area and the sites to be treated were identified. A time out was called and performed. The area to be treated was prepped three times with alcohol and the alcohol allowed to dry. Next, a 30 gauge needle was used to inject the medication in the area to be treated.     Date of Last Injection: 5/29/2024  Response: good  Onset of response:  1 week   Wearing off: none  Side Effects: none  : [employer]  Lot #: [id number]  Expiration Date: 12/2026  NDC: 29568ax42   Botox is patient supplied    Botox was injected using FDA approved protocol for chronic migraine prevention.   200 unit vial Botox was re-constituted with 4 mL 0.9 NS to 5 unit/0.1 mL using standard techniques.  10 units were given at the  muscle in 2 divided sites  5 units were given at the Procerus muscle  20 units were given at the Frontalis muscle in 4 divided sites  40 units were given at the Temporalis muscle in 8 divided sites  30 units were given at the Occipitalis muscle in 6 divided sites  20 units were given at the Cervical paraspinal muscle in 4 divided sites  30 units were given at the Trapezius muscle in 6 divided sites  5 units were given at the Masseter muscle in 2 divided sites.    The total amount injected in units is 160  The total amount wasted in units is 40  The total amount submitted in units is 200.   Botox was administered by Nurse practitioner.     Patient tolerated procedure well with no immediate complications.      Assessment / Plan      Assessment/Plan:   Diagnoses and all orders for this visit:    1. Migraine without aura and without status migrainosus, not intractable (Primary)  Comments:  Cont botox         Patient Education:       Reviewed medications, potential side effects and signs and symptoms to report. Discussed risk versus benefits of treatment plan with patient and/or family-including medications, labs and radiology that may be ordered. Addressed questions and concerns during visit. Patient and/or family verbalized understanding and agree with plan. Instructed to call the office with any questions and report to ER with any life-threatening symptoms.     Follow Up:   It has been determined that this patient has chronic migraine headaches. We will proceed with a 12 week regimen of 200 units of Botox injections, to treat the patient's chronic migraines.    Follow up in 3 months    During this visit the following were done:  Labs Reviewed []    Labs Ordered []    Radiology Reports Reviewed []    Radiology Ordered []    PCP Records Reviewed []    Referring Provider Records Reviewed []    ER Records Reviewed []    Hospital Records Reviewed []    History Obtained From Family []    Radiology Images Reviewed []    Other Reviewed []    Records Requested []        Kyra Vázquez, DNP, APRN

## 2024-09-26 ENCOUNTER — SPECIALTY PHARMACY (OUTPATIENT)
Dept: ONCOLOGY | Facility: HOSPITAL | Age: 44
End: 2024-09-26
Payer: COMMERCIAL

## 2024-09-30 RX ORDER — TRAZODONE HYDROCHLORIDE 100 MG/1
TABLET ORAL
Qty: 180 TABLET | Refills: 1 | Status: SHIPPED | OUTPATIENT
Start: 2024-09-30

## 2024-10-23 ENCOUNTER — SPECIALTY PHARMACY (OUTPATIENT)
Dept: ONCOLOGY | Facility: HOSPITAL | Age: 44
End: 2024-10-23
Payer: COMMERCIAL

## 2024-10-23 NOTE — PROGRESS NOTES
Specialty Pharmacy Refill Coordination Note     Ivette is a 44 y.o. female contacted today regarding refills of Emgality and Botox specialty medication(s).Patient is due for injection on 10/10.    Reviewed and verified with patient:       Specialty medication(s) and dose(s) confirmed: yes    Refill Questions      Flowsheet Row Most Recent Value   Changes to allergies? No   Changes to medications? No   New conditions or infections since last clinic visit No   Unplanned office visit, urgent care, ED, or hospital admission in the last 4 weeks  No   How does patient/caregiver feel medication is working? Good   Financial problems or insurance changes  No   If yes, describe changes in insurance or financial issues. N/A   Since the previous refill, were any specialty medication doses or scheduled injections missed or delayed?  Yes   If yes, please provide the amount N/A   Why were doses missed? N/A   Does this patient require a clinical escalation to a pharmacist? No            Delivery Questions      Flowsheet Row Most Recent Value   Delivery method UPS   Delivery address verified with patient/caregiver? Yes   Delivery address Home   Number of medications in delivery 2   Medication(s) being filled and delivered Galcanezumab-gnlm (EMGALITY), OnabotulinumtoxinA   Doses left of specialty medications N/A   Copay verified? Yes   Copay amount Botox and Emgality =$0   Copay form of payment No copayment ($0)   Ship Date 10/23   Delivery Date 10/24   Signature Required No              Medication Adherence    Adherence tools used: calendar  Support network for adherence: healthcare provider          Follow-up: 28 day(s)     Viraj Masterson  Specialty Pharmacy Technician

## 2024-10-28 ENCOUNTER — SPECIALTY PHARMACY (OUTPATIENT)
Dept: ONCOLOGY | Facility: HOSPITAL | Age: 44
End: 2024-10-28
Payer: COMMERCIAL

## 2024-11-15 ENCOUNTER — PROCEDURE VISIT (OUTPATIENT)
Dept: NEUROLOGY | Facility: CLINIC | Age: 44
End: 2024-11-15
Payer: COMMERCIAL

## 2024-11-15 ENCOUNTER — SPECIALTY PHARMACY (OUTPATIENT)
Dept: ONCOLOGY | Facility: HOSPITAL | Age: 44
End: 2024-11-15
Payer: COMMERCIAL

## 2024-11-15 DIAGNOSIS — G43.009 MIGRAINE WITHOUT AURA AND WITHOUT STATUS MIGRAINOSUS, NOT INTRACTABLE: Primary | ICD-10-CM

## 2024-11-15 RX ORDER — ESTRADIOL 0.03 MG/D
1 FILM, EXTENDED RELEASE TRANSDERMAL 2 TIMES WEEKLY
COMMUNITY

## 2024-11-15 NOTE — PROGRESS NOTES
Specialty Pharmacy Patient Management Program  Neurology Reassessment     Ivette Hughes is a 44 y.o. female with migraines seen by a Neurology provider and enrolled in the Neurology Patient Management program offered by [facility].  A follow-up outreach was conducted, including assessment of continued therapy appropriateness, medication adherence, and side effect incidence and management for botox 155 units in office injections, and emgality 120 mg/mL.     Changes to Insurance Coverage or Financial Support  No changes.     Relevant Past Medical History and Comorbidities  Relevant medical history and concomitant health conditions were discussed with the patient. The patient's chart has been reviewed for relevant past medical history and comorbid health conditions and updated as necessary.   Past Medical History:   Diagnosis Date    Anxiety     Cluster headache     Gall stones     Insomnia     Kidney stones     Migraine headache     Panic attack     Tension headache      Social History     Socioeconomic History    Marital status:    Tobacco Use    Smoking status: Never    Smokeless tobacco: Never   Vaping Use    Vaping status: Never Used   Substance and Sexual Activity    Alcohol use: Never    Drug use: Never    Sexual activity: Defer     Problem list reviewed by Joaquín Portillo, PharmD on 11/15/2024 at  2:30 PM    Hospitalizations and Urgent Care Since Last Assessment  ED Visits, Admissions, or Hospitalizations: none   Urgent Office Visits: none     Allergies  Known allergies and reactions were discussed with the patient. The patient's chart has been reviewed for allergy information and updated as necessary.   No Known Allergies  Allergies reviewed by Joaquín Portillo, PharmD on 11/15/2024 at  2:30 PM    Relevant Laboratory Values      Lab Assessment        Current Medication List  This medication list has been reviewed with the patient and evaluated for any interactions  or necessary modifications/recommendations, and updated to include all prescription medications, OTC medications, and supplements the patient is currently taking.  This list reflects what is contained in the patient's profile, which has also been marked as reviewed to communicate to other providers it is the most up to date version of the patient's current medication therapy.     Current Outpatient Medications:     estradiol (VIVELLE-DOT) 0.025 MG/24HR patch, Place 1 patch on the skin as directed by provider 2 (Two) Times a Week., Disp: , Rfl:     fluconazole (Diflucan) 150 MG tablet, Take 1 tab PO on day 1 then 1 tab PO on day 3, Disp: 2 tablet, Rfl: 1    fluticasone (FLONASE) 50 MCG/ACT nasal spray, 1-2 sprays into the nostril(s) as directed by provider Daily., Disp: 18.2 mL, Rfl: 11    galcanezumab-gnlm (EMGALITY) 120 MG/ML auto-injector pen, Inject 1 mL under the skin into the appropriate area as directed Every 28 (Twenty-Eight) Days., Disp: 1 mL, Rfl: 11    HYDROcodone-acetaminophen (NORCO) 5-325 MG per tablet, Take 1 tablet by mouth Every 6 (Six) Hours As Needed for Moderate Pain., Disp: 12 tablet, Rfl: 0    hydrocortisone (ANUSOL-HC) 25 MG suppository, Insert 1 suppository into the rectum 2 (Two) Times a Day., Disp: 24 each, Rfl: 1    ibuprofen (ADVIL,MOTRIN) 800 MG tablet, 1 tab PO Q8h PRN moderate pain, Disp: 90 tablet, Rfl: 5    methocarbamol (ROBAXIN) 750 MG tablet, Take 1 tablet by mouth 3 (Three) Times a Day As Needed for Muscle Spasms., Disp: 30 tablet, Rfl: 2    nitrofurantoin, macrocrystal-monohydrate, (Macrobid) 100 MG capsule, Take 1 capsule by mouth 2 (Two) Times a Day., Disp: 20 capsule, Rfl: 0    OnabotulinumtoxinA 200 units reconstituted solution, FOR . PHYSICIAN TO INJECT 155 UNITS INTRAMUSCULARLY INTO HEAD, NECK AND SHOULDERS EVERY 12 WEEKS Per FDA PROTOCOL, Disp: 1 each, Rfl: 3    ondansetron ODT (ZOFRAN-ODT) 8 MG disintegrating tablet, Place 1 tablet on the tongue Every  8 (Eight) Hours As Needed for Nausea or Vomiting., Disp: 24 tablet, Rfl: 5    phenazopyridine (Pyridium) 200 MG tablet, Take 1 tablet by mouth 3 (Three) Times a Day As Needed for Bladder Spasms., Disp: 9 tablet, Rfl: 0    promethazine (PHENERGAN) 25 MG suppository, Insert 1 suppository into the rectum Every 6 (Six) Hours As Needed for Nausea or Vomiting., Disp: 30 suppository, Rfl: 1    promethazine (PHENERGAN) 25 MG suppository, Insert 1 suppository into the rectum Every 6 (Six) Hours As Needed for Nausea or Vomiting., Disp: 24 suppository, Rfl: 5    sertraline (ZOLOFT) 100 MG tablet, Take 1.5 tablets by mouth Daily., Disp: 135 tablet, Rfl: 3    tamsulosin (FLOMAX) 0.4 MG capsule 24 hr capsule, Take 1 capsule by mouth Daily., Disp: 30 capsule, Rfl: 0    traZODone (DESYREL) 100 MG tablet, TAKE ONE TO TWO TABLETS BY MOUTH EVERY NIGHT AT BEDTIME, Disp: 180 tablet, Rfl: 1    Current Facility-Administered Medications:     OnabotulinumtoxinA 200 Units, 200 Units, Intramuscular, Q3 Months, Kyra Vázquez, DNP, APRN, 200 Units at 11/15/24 0923    Medicines reviewed by Joaquín Portillo, PharmD on 11/15/2024 at  2:30 PM    Drug Interactions  No relevant drug drug interactions with specialty medication.       Adverse Drug Reactions  Medication tolerability: Tolerating with no to minimal ADRs          Medication plan: Continue therapy with normal follow-up  Plan for ADR Management: patient to report      Adherence, Self-Administration, and Current Therapy Problems  Adherence related patient's specialty therapy was discussed with the patient. The Adherence segment of this outreach has been reviewed and updated.   Adherence Questions  Linked Medication(s) Assessed: Galcanezumab-gnlm (EMGALITY), OnabotulinumtoxinA  On average, how many doses/injections does the patient miss per month?: 0  What are the identified reasons for non-adherence or missed doses? : no problems identified  What is the estimated medication  adherence level?: %  Based on the patient/caregiver response and refill history, does this patient require an MTP to track adherence improvements?: no    Additional Barriers to Patient Self-Administration: none  Methods for Supporting Patient Self-Administration: n/a    Recently Close Medication Therapy Problems  No medication therapy recommendations to display  Open Medication Therapy Problems  No medication therapy recommendations to display     Goals of Therapy  Goals related to the patient's specialty therapy was discussed with the patient. The Patient Goals segment of this outreach has been reviewed and updated.    Goals Addressed Today        Specialty Pharmacy General Goal      Decrease frequency, severity and duration of migraine attacks from baseline.                   Quality of Life Assessment   Quality of Life related to the patient's enrollment in the patient management program and services provided was discussed with the patient. The QOL segment of this outreach has been reviewed and updated.   Quality of Life Improvement Scale: 8-Moderately better    Reassessment Plan & Follow-Up  Medication Therapy Changes: n/a  Related Plans, Therapy Recommendations, or Issues to Be Addressed: n/a  Pharmacist to perform regular reassessments no more than (6) months from the previous assessment.  Care Coordinator to set up future refill outreaches, coordinate prescription delivery, and escalate clinical questions to pharmacist.     Attestation  Therapeutic appropriateness: Appropriate  I attest the patient was actively involved in and has agreed to the above plan of care. If the prescribed therapy is at any point deemed not appropriate based on the current or future assessments, a consultation will be initiated with the patient's specialty care provider to determine the best course of action. The revised plan of therapy will be documented along with any additional patient education provided. Discussed  aforementioned material with patient in person.    Joaquín Portillo, PharmD, [facility] Specialty Pharmacist, Neurology  11/15/2024  14:33 EST

## 2024-11-15 NOTE — PROGRESS NOTES
Botox Procedure Note       Patient Name: Ivette Hughes  : 1980   MRN: [id number]     Chief Complaint:    Chief Complaint   Patient presents with    Botulinum Toxin Injection     History of Present Illness: Ivette Hughes is a 44 y.o. female who is here today for Botox injections for migraines.     We have discussed risk and benefits of this Botox procedure and common side effects including headache, bleeding, infection, worsening of pain, neck pain, neck stiffness or weakness, damage to the areas being injected, weakness of muscles, loss of muscle control, ptosis, flu-like symptoms as well as more serious possible adverse effects including possible dysphagia, facial droop if injecting the facial area, painful injection, respiratory distress or even death (death has only been reported once with adults for Botox for migraines in another state when mixed with lidocaine solution which we do not use lidocaine solution in our practice for mixing Botox). The patient verbally understands and accepts risks and agrees with moving forward with Botox injections for chronic migraine prevention.    Verbal and written consent has been obtained from the patient prior to beginning treatment injections.     Pertinent Medical History:  Response to treatment has reduced headaches at least 7 fewer days a month and / or 100 hours fewer each month.       Subjective          The following portions of the patient's history were reviewed an updated as appropriate: past family history, past medical history, past social history, past surgical history.     Medications:     Current Outpatient Medications:     estradiol (VIVELLE-DOT) 0.025 MG/24HR patch, Place 1 patch on the skin as directed by provider 2 (Two) Times a Week., Disp: , Rfl:     fluconazole (Diflucan) 150 MG tablet, Take 1 tab PO on day 1 then 1 tab PO on day 3, Disp: 2 tablet, Rfl: 1    fluticasone (FLONASE) 50 MCG/ACT nasal spray, 1-2 sprays into the nostril(s) as  directed by provider Daily., Disp: 18.2 mL, Rfl: 11    galcanezumab-gnlm (EMGALITY) 120 MG/ML auto-injector pen, Inject 1 mL under the skin into the appropriate area as directed Every 28 (Twenty-Eight) Days., Disp: 1 mL, Rfl: 11    HYDROcodone-acetaminophen (NORCO) 5-325 MG per tablet, Take 1 tablet by mouth Every 6 (Six) Hours As Needed for Moderate Pain., Disp: 12 tablet, Rfl: 0    hydrocortisone (ANUSOL-HC) 25 MG suppository, Insert 1 suppository into the rectum 2 (Two) Times a Day., Disp: 24 each, Rfl: 1    ibuprofen (ADVIL,MOTRIN) 800 MG tablet, 1 tab PO Q8h PRN moderate pain, Disp: 90 tablet, Rfl: 5    methocarbamol (ROBAXIN) 750 MG tablet, Take 1 tablet by mouth 3 (Three) Times a Day As Needed for Muscle Spasms., Disp: 30 tablet, Rfl: 2    nitrofurantoin, macrocrystal-monohydrate, (Macrobid) 100 MG capsule, Take 1 capsule by mouth 2 (Two) Times a Day., Disp: 20 capsule, Rfl: 0    OnabotulinumtoxinA 200 units reconstituted solution, FOR . PHYSICIAN TO INJECT 155 UNITS INTRAMUSCULARLY INTO HEAD, NECK AND SHOULDERS EVERY 12 WEEKS Per FDA PROTOCOL, Disp: 1 each, Rfl: 3    ondansetron ODT (ZOFRAN-ODT) 8 MG disintegrating tablet, Place 1 tablet on the tongue Every 8 (Eight) Hours As Needed for Nausea or Vomiting., Disp: 24 tablet, Rfl: 5    phenazopyridine (Pyridium) 200 MG tablet, Take 1 tablet by mouth 3 (Three) Times a Day As Needed for Bladder Spasms., Disp: 9 tablet, Rfl: 0    promethazine (PHENERGAN) 25 MG suppository, Insert 1 suppository into the rectum Every 6 (Six) Hours As Needed for Nausea or Vomiting., Disp: 30 suppository, Rfl: 1    promethazine (PHENERGAN) 25 MG suppository, Insert 1 suppository into the rectum Every 6 (Six) Hours As Needed for Nausea or Vomiting., Disp: 24 suppository, Rfl: 5    sertraline (ZOLOFT) 100 MG tablet, Take 1.5 tablets by mouth Daily., Disp: 135 tablet, Rfl: 3    tamsulosin (FLOMAX) 0.4 MG capsule 24 hr capsule, Take 1 capsule by mouth Daily., Disp:  30 capsule, Rfl: 0    traZODone (DESYREL) 100 MG tablet, TAKE ONE TO TWO TABLETS BY MOUTH EVERY NIGHT AT BEDTIME, Disp: 180 tablet, Rfl: 1    Current Facility-Administered Medications:     OnabotulinumtoxinA 200 Units, 200 Units, Intramuscular, Q3 Months, Kyra Vázquez, DNP, APRN, 200 Units at 08/22/24 0938    Allergies:   No Known Allergies    Objective     Physical Exam:  Alert and oriented x3    Vital Signs:   BOTOX PROCEDURE:     The patient was placed in a comfortable area and the sites to be treated were identified. A time out was called and performed. The area to be treated was prepped three times with alcohol and the alcohol allowed to dry. Next, a 30 gauge needle was used to inject the medication in the area to be treated.     Date of Last Injection: 8/22/24  Response: good  Onset of response: 1 week   Wearing off: none  Side Effects: none  : Allergan  Lot #: [id number]  Expiration Date: 02/2027  NDC: 52826023   Botox is patient supplied    Botox was injected using FDA approved protocol for chronic migraine prevention.   200 unit vial Botox was re-constituted with 4 mL 0.9 NS to 5 unit/0.1 mL using standard techniques.  10 units were given at the  muscle in 2 divided sites  5 units were given at the Procerus muscle  20 units were given at the Frontalis muscle in 4 divided sites  40 units were given at the Temporalis muscle in 8 divided sites  30 units were given at the Occipitalis muscle in 6 divided sites  20 units were given at the Cervical paraspinal muscle in 4 divided sites  30 units were given at the Trapezius muscle in 6 divided sites  5 units were given at the Masseter muscle in 2 divided sites    The total amount injected in units is 160  The total amount wasted in units is 50  The total amount submitted in units is 200.   Botox was administered by Nurse practitioner.     Patient tolerated procedure well with no immediate complications.     Assessment / Plan       Assessment/Plan:   Diagnoses and all orders for this visit:    1. Migraine without aura and without status migrainosus, not intractable (Primary)  Comments:  Cont botox         Patient Education:       Reviewed medications, potential side effects and signs and symptoms to report. Discussed risk versus benefits of treatment plan with patient and/or family-including medications, labs and radiology that may be ordered. Addressed questions and concerns during visit. Patient and/or family verbalized understanding and agree with plan. Instructed to call the office with any questions and report to ER with any life-threatening symptoms.     Follow Up:   It has been determined that this patient has chronic migraine headaches. We will proceed with a 12 week regimen of 200 units of Botox injections, to treat the patient's chronic migraines.    Follow up in 3 months    During this visit the following were done:  Labs Reviewed []    Labs Ordered []    Radiology Reports Reviewed []    Radiology Ordered []    PCP Records Reviewed []    Referring Provider Records Reviewed []    ER Records Reviewed []    Hospital Records Reviewed []    History Obtained From Family []    Radiology Images Reviewed []    Other Reviewed []    Records Requested []        Kyra Vázquez, DNP, APRN

## 2024-11-20 ENCOUNTER — SPECIALTY PHARMACY (OUTPATIENT)
Dept: ONCOLOGY | Facility: HOSPITAL | Age: 44
End: 2024-11-20
Payer: COMMERCIAL

## 2024-12-05 ENCOUNTER — SPECIALTY PHARMACY (OUTPATIENT)
Dept: ONCOLOGY | Facility: HOSPITAL | Age: 44
End: 2024-12-05
Payer: COMMERCIAL

## 2024-12-05 NOTE — PROGRESS NOTES
Specialty Pharmacy Refill Coordination Note     Ivette is a 44 y.o. female contacted today regarding refills of Emgality specialty medication(s).Patient is due for injection on 12/10.    Reviewed and verified with patient:       Specialty medication(s) and dose(s) confirmed: yes    Refill Questions      Flowsheet Row Most Recent Value   Changes to allergies? No   Changes to medications? No   New conditions or infections since last clinic visit No   Unplanned office visit, urgent care, ED, or hospital admission in the last 4 weeks  No   How does patient/caregiver feel medication is working? Good   Financial problems or insurance changes  No   If yes, describe changes in insurance or financial issues. N/A   Since the previous refill, were any specialty medication doses or scheduled injections missed or delayed?  No   If yes, please provide the amount N/A   Why were doses missed? N/A   Does this patient require a clinical escalation to a pharmacist? No            Delivery Questions      Flowsheet Row Most Recent Value   Delivery method UPS   Delivery address verified with patient/caregiver? Yes   Delivery address Home   Number of medications in delivery 1   Medication(s) being filled and delivered Galcanezumab-gnlm (EMGALITY)   Doses left of specialty medications N/A   Copay verified? Yes   Copay amount Emgality =$0   Copay form of payment No copayment ($0)   Ship Date 12/05   Delivery Date 12/06   Signature Required No              Medication Adherence    Adherence tools used: calendar  Support network for adherence: healthcare provider          Follow-up: 28 day(s)     Viraj Masterson  Specialty Pharmacy Technician

## 2024-12-31 ENCOUNTER — SPECIALTY PHARMACY (OUTPATIENT)
Dept: ONCOLOGY | Facility: HOSPITAL | Age: 44
End: 2024-12-31
Payer: COMMERCIAL

## 2024-12-31 NOTE — PROGRESS NOTES
Specialty Pharmacy Refill Coordination Note     Ivette is a 44 y.o. female contacted today regarding refills of her specialty medication(s).    Specialty medication(s) and dose(s) confirmed: emgality 120 mg/mL  Changes to medications: no  Changes to insurance: no  Reviewed and verified with patient:         Refill Questions      Flowsheet Row Most Recent Value   Changes to allergies? No   Changes to medications? No   New conditions or infections since last clinic visit No   Unplanned office visit, urgent care, ED, or hospital admission in the last 4 weeks  No   How does patient/caregiver feel medication is working? Good   Financial problems or insurance changes  No   Since the previous refill, were any specialty medication doses or scheduled injections missed or delayed?  No   Does this patient require a clinical escalation to a pharmacist? No            Delivery Questions      Flowsheet Row Most Recent Value   Delivery method UPS   Delivery address verified with patient/caregiver? Yes   Delivery address Home  [[facility] [place] [facility] is new address]   Number of medications in delivery 1   Medication(s) being filled and delivered Galcanezumab-gnlm (EMGALITY)   Doses left of specialty medications due on 1/13/25   Copay verified? Yes   Copay amount 0$   Copay form of payment No copayment ($0)   Ship Date 1/2   Delivery Date 1/3   Signature Required No            Medication Adherence    Adherence tools used: calendar  Support network for adherence: healthcare provider          Follow-up: 4 week(s)     Joaquín Portillo, PharmD  12/31/2024   10:49 EST

## 2025-01-17 ENCOUNTER — SPECIALTY PHARMACY (OUTPATIENT)
Dept: ONCOLOGY | Facility: HOSPITAL | Age: 45
End: 2025-01-17
Payer: COMMERCIAL

## 2025-01-17 NOTE — PROGRESS NOTES
Specialty Pharmacy Refill Coordination Note     Ivette is a 44 y.o. female contacted today regarding refills of Botox and Emgality specialty medication(s).Patient is due for injection on 02/05.    Reviewed and verified with patient:       Specialty medication(s) and dose(s) confirmed: yes    Refill Questions      Flowsheet Row Most Recent Value   Changes to allergies? No   Changes to medications? No   New conditions or infections since last clinic visit No   Unplanned office visit, urgent care, ED, or hospital admission in the last 4 weeks  No   How does patient/caregiver feel medication is working? Good   Financial problems or insurance changes  No   If yes, describe changes in insurance or financial issues. N/A   Since the previous refill, were any specialty medication doses or scheduled injections missed or delayed?  No   If yes, please provide the amount N/A   Why were doses missed? N/A   Does this patient require a clinical escalation to a pharmacist? No            Delivery Questions      Flowsheet Row Most Recent Value   Delivery method UPS   Delivery address verified with patient/caregiver? Yes   Delivery address Home   Number of medications in delivery 2   Medication(s) being filled and delivered OnabotulinumtoxinA, Galcanezumab-gnlm (EMGALITY)   Doses left of specialty medications N/A   Copay verified? Yes   Copay amount Botox and Emgality =$0   Copay form of payment No copayment ($0)   Ship Date 01/21   Delivery Date 01/22   Signature Required No              Medication Adherence    Adherence tools used: calendar  Support network for adherence: healthcare provider          Follow-up: 28 day(s)     Viraj Masterson  Specialty Pharmacy Technician